# Patient Record
Sex: FEMALE | Race: WHITE | NOT HISPANIC OR LATINO | Employment: OTHER | URBAN - METROPOLITAN AREA
[De-identification: names, ages, dates, MRNs, and addresses within clinical notes are randomized per-mention and may not be internally consistent; named-entity substitution may affect disease eponyms.]

---

## 2018-11-07 RX ORDER — OXYCODONE AND ACETAMINOPHEN 5; 325 MG/1; MG/1
TABLET ORAL
Refills: 0 | COMMUNITY
Start: 2018-10-26 | End: 2018-11-26

## 2018-11-08 ENCOUNTER — OFFICE VISIT (OUTPATIENT)
Dept: FAMILY MEDICINE | Facility: CLINIC | Age: 63
End: 2018-11-08
Payer: MEDICARE

## 2018-11-08 VITALS
OXYGEN SATURATION: 97 % | TEMPERATURE: 98 F | RESPIRATION RATE: 18 BRPM | BODY MASS INDEX: 38.41 KG/M2 | WEIGHT: 225 LBS | SYSTOLIC BLOOD PRESSURE: 140 MMHG | HEART RATE: 115 BPM | DIASTOLIC BLOOD PRESSURE: 90 MMHG | HEIGHT: 64 IN

## 2018-11-08 DIAGNOSIS — E11.65 TYPE 2 DIABETES MELLITUS WITH HYPERGLYCEMIA, WITHOUT LONG-TERM CURRENT USE OF INSULIN: Primary | ICD-10-CM

## 2018-11-08 DIAGNOSIS — E66.01 MORBID OBESITY: ICD-10-CM

## 2018-11-08 DIAGNOSIS — I10 ESSENTIAL HYPERTENSION: ICD-10-CM

## 2018-11-08 DIAGNOSIS — Z23 NEED FOR INFLUENZA VACCINATION: ICD-10-CM

## 2018-11-08 DIAGNOSIS — K21.9 GASTROESOPHAGEAL REFLUX DISEASE WITHOUT ESOPHAGITIS: ICD-10-CM

## 2018-11-08 DIAGNOSIS — I70.0 ATHEROSCLEROSIS OF AORTA: ICD-10-CM

## 2018-11-08 PROCEDURE — 99204 OFFICE O/P NEW MOD 45 MIN: CPT | Mod: ,,, | Performed by: FAMILY MEDICINE

## 2018-11-08 PROCEDURE — 1101F PT FALLS ASSESS-DOCD LE1/YR: CPT | Mod: ,,, | Performed by: FAMILY MEDICINE

## 2018-11-08 PROCEDURE — 3080F DIAST BP >= 90 MM HG: CPT | Mod: ,,, | Performed by: FAMILY MEDICINE

## 2018-11-08 PROCEDURE — 3077F SYST BP >= 140 MM HG: CPT | Mod: ,,, | Performed by: FAMILY MEDICINE

## 2018-11-08 PROCEDURE — 3008F BODY MASS INDEX DOCD: CPT | Mod: ,,, | Performed by: FAMILY MEDICINE

## 2018-11-08 RX ORDER — OMEPRAZOLE 20 MG/1
20 CAPSULE, DELAYED RELEASE ORAL DAILY
Qty: 30 CAPSULE | Refills: 3 | Status: SHIPPED | OUTPATIENT
Start: 2018-11-08

## 2018-11-08 RX ORDER — METFORMIN HYDROCHLORIDE 1000 MG/1
1000 TABLET ORAL 2 TIMES DAILY WITH MEALS
Qty: 180 TABLET | Refills: 3 | Status: SHIPPED | OUTPATIENT
Start: 2018-11-08 | End: 2019-11-08

## 2018-11-08 RX ORDER — AMLODIPINE BESYLATE 5 MG/1
5 TABLET ORAL DAILY
Qty: 30 TABLET | Refills: 11 | Status: SHIPPED | OUTPATIENT
Start: 2018-11-08 | End: 2018-11-26

## 2018-11-08 RX ORDER — DOXYCYCLINE HYCLATE 100 MG
100 TABLET ORAL EVERY 12 HOURS
Qty: 60 TABLET | Refills: 3 | Status: SHIPPED | OUTPATIENT
Start: 2018-11-08 | End: 2018-11-08 | Stop reason: SDUPTHER

## 2018-11-08 RX ORDER — DOXYCYCLINE HYCLATE 100 MG
100 TABLET ORAL EVERY 12 HOURS
Qty: 180 TABLET | Refills: 2 | Status: SHIPPED | OUTPATIENT
Start: 2018-11-08 | End: 2019-02-12

## 2018-11-08 RX ORDER — OLMESARTAN MEDOXOMIL AND HYDROCHLOROTHIAZIDE 40/25 40; 25 MG/1; MG/1
1 TABLET ORAL DAILY
Qty: 30 TABLET | Refills: 11 | Status: SHIPPED | OUTPATIENT
Start: 2018-11-08

## 2018-11-08 NOTE — TELEPHONE ENCOUNTER
Patient called to state that her insurance is requesting a 90 day supply of this medication be sent instead of the 30 day. Please add the DX code and please approve to be sent to the patient's local pharmacy.    Thank you!

## 2018-11-08 NOTE — PROGRESS NOTES
SUBJECTIVE:    Patient ID: Yoshi Lennon is a 63 y.o. female.    Chief Complaint: Hospital Follow Up; Nephrolithiasis; and Diabetes    62 yo female new to this provider limited medical records to review pt was seen in the ER for possible nephrolithiasis (they assume she has passed the stone) Pt was noted to have uncontrolled HTN and a Blood glucose of 375. Pt has known diagnoses of HTN/DM has been appropriately trteated in the past but due to family and financial issues she has not taken any medications in years and has not had routine lab testing completed in years.    CT of abd 2017:  There is mild atherosclerosis of the abdominal aorta without aneurysmal dilatation.    Hypertension   This is a chronic problem. The current episode started more than 1 year ago. The problem has been gradually worsening since onset. The problem is uncontrolled. Pertinent negatives include no anxiety, blurred vision, chest pain, headaches, malaise/fatigue, neck pain, orthopnea, palpitations, peripheral edema, PND, shortness of breath or sweats. There are no associated agents to hypertension. Risk factors for coronary artery disease include diabetes mellitus, sedentary lifestyle and obesity. Past treatments include angiotensin blockers, calcium channel blockers and diuretics. Improvement on treatment: Pt stopped medications several years ago. Compliance problems include diet, exercise and medication cost.  Unknown. Unknown.   Diabetes   She presents for her follow-up diabetic visit. She has type 2 diabetes mellitus. Her disease course has been worsening. There are no hypoglycemic associated symptoms. Pertinent negatives for hypoglycemia include no headaches, seizures or sweats. Associated symptoms include polydipsia, polyphagia and polyuria. Pertinent negatives for diabetes include no blurred vision, no chest pain, no fatigue, no foot paresthesias, no foot ulcerations, no visual change, no weakness and no weight loss. There are no  hypoglycemic complications. Symptoms are worsening. (Unknown  ) Risk factors for coronary artery disease include diabetes mellitus, hypertension, obesity and sedentary lifestyle. When asked about current treatments, none were reported. She is compliant with treatment none of the time. Weight trend: Unknown. She is following a generally unhealthy diet. When asked about meal planning, she reported none. She has not had a previous visit with a dietitian. She never participates in exercise. Home blood sugar record trend: Unknown. An ACE inhibitor/angiotensin II receptor blocker is being taken. She does not see a podiatrist.Eye exam is not current.   Gastroesophageal Reflux   She complains of heartburn. She reports no chest pain, no sore throat or no wheezing. This is a chronic problem. The current episode started more than 1 year ago. The problem occurs frequently. The problem has been unchanged. The heartburn is located in the substernum. The heartburn wakes her from sleep. The heartburn does not limit her activity. The heartburn doesn't change with position. The symptoms are aggravated by lying down. Pertinent negatives include no anemia, fatigue, melena, muscle weakness, orthopnea or weight loss. Risk factors include obesity and lack of exercise. She has tried an antacid and a PPI for the symptoms. The treatment provided significant relief.         Past Medical History:   Diagnosis Date    Diabetes mellitus     GERD (gastroesophageal reflux disease)     HLD (hyperlipidemia)     Hypertension      Social History     Socioeconomic History    Marital status:      Spouse name: Not on file    Number of children: Not on file    Years of education: Not on file    Highest education level: Not on file   Social Needs    Financial resource strain: Not on file    Food insecurity - worry: Not on file    Food insecurity - inability: Not on file    Transportation needs - medical: Not on file    Transportation  needs - non-medical: Not on file   Occupational History    Not on file   Tobacco Use    Smoking status: Never Smoker    Smokeless tobacco: Never Used   Substance and Sexual Activity    Alcohol use: Yes     Comment: socially     Drug use: No    Sexual activity: Yes     Partners: Male   Other Topics Concern    Not on file   Social History Narrative    Not on file     Past Surgical History:   Procedure Laterality Date    COLON SURGERY      tumor removed from colon    INCISION AND DRAINAGE (I&D), ABSCESS Left 4/1/2016    Performed by [facility] at [facility]    INCISION AND DRAINAGE (I&D), BREAST Right 4/1/2016    Performed by [facility] at [facility]    ROTATOR CUFF REPAIR  2005    Right     History reviewed. No pertinent family history.  Current Outpatient Medications   Medication Sig Dispense Refill    naproxen sodium (ANAPROX) 220 MG tablet Take 440 mg by mouth every 12 (twelve) hours.      olmesartan-hydrochlorothiazide (BENICAR HCT) 40-25 mg per tablet Take 1 tablet by mouth once daily. 30 tablet 11    omeprazole (PRILOSEC) 20 MG capsule Take 1 capsule (20 mg total) by mouth once daily. 30 capsule 3    oxyCODONE-acetaminophen (PERCOCET) 5-325 mg per tablet TK 1 T PO Q 6 HOURS PRN P  0    amLODIPine (NORVASC) 5 MG tablet Take 1 tablet (5 mg total) by mouth once daily. 30 tablet 11    aspirin 81 MG Chew Take 1 tablet (81 mg total) by mouth once daily. 30 tablet 12    blood sugar diagnostic Strp 1 strip by Misc.(Non-Drug; Combo Route) route once daily. 100 strip 0    doxycycline (VIBRA-TABS) 100 MG tablet Take 1 tablet (100 mg total) by mouth every 12 (twelve) hours. 180 tablet 2    metFORMIN (GLUCOPHAGE) 1000 MG tablet Take 1 tablet (1,000 mg total) by mouth 2 (two) times daily with meals. 180 tablet 3     No current facility-administered medications for this visit.      Review of patient's allergies indicates:  No Known Allergies    Review of Systems  "  Constitutional: Negative for activity change, appetite change, fatigue, fever, malaise/fatigue, unexpected weight change and weight loss.   HENT: Negative for ear discharge, ear pain, sinus pressure, sinus pain and sore throat.    Eyes: Negative for blurred vision.   Respiratory: Negative for chest tightness, shortness of breath and wheezing.    Cardiovascular: Negative for chest pain, palpitations, orthopnea and PND.   Gastrointestinal: Positive for heartburn. Negative for melena.   Endocrine: Positive for polydipsia, polyphagia and polyuria.   Genitourinary: Negative for dysuria, frequency, hematuria and urgency.   Musculoskeletal: Negative for arthralgias, back pain, joint swelling, muscle weakness and neck pain.   Neurological: Negative for seizures, weakness, numbness and headaches.          Blood pressure (!) 140/90, pulse (!) 115, temperature 98.3 °F (36.8 °C), temperature source Oral, resp. rate 18, height 5' 4" (1.626 m), weight 102.1 kg (225 lb), SpO2 97 %. Body mass index is 38.62 kg/m².   Objective:      Physical Exam   Constitutional: She is oriented to person, place, and time. She appears well-developed and well-nourished. No distress.   HENT:   Head: Normocephalic and atraumatic.   Right Ear: External ear normal.   Left Ear: External ear normal.   Eyes: Conjunctivae and EOM are normal. Pupils are equal, round, and reactive to light. No scleral icterus.   Neck: Normal range of motion.   Cardiovascular: Regular rhythm and normal heart sounds.   No murmur heard.  Pulmonary/Chest: Effort normal and breath sounds normal. No respiratory distress. She has no wheezes.   Neurological: She is alert and oriented to person, place, and time.   Skin: Skin is warm and dry. Capillary refill takes less than 2 seconds. No rash noted. She is not diaphoretic.           Assessment:       1. Type 2 diabetes mellitus with hyperglycemia, without long-term current use of insulin    2. Essential hypertension    3. Morbid " obesity    4. Need for influenza vaccination    5. Gastroesophageal reflux disease without esophagitis         Plan:             Type 2 diabetes mellitus with hyperglycemia, without long-term current use of insulin  -     metFORMIN (GLUCOPHAGE) 1000 MG tablet; Take 1 tablet (1,000 mg total) by mouth 2 (two) times daily with meals.  Dispense: 180 tablet; Refill: 3  -     blood sugar diagnostic Strp; 1 strip by Misc.(Non-Drug; Combo Route) route once daily.  Dispense: 100 strip; Refill: 0  -     Lipid panel; Future; Expected date: 11/08/2018  -     Hemoglobin A1c; Future; Expected date: 11/08/2018  -     Microalbumin/creatinine urine ratio  Patient with long-standing diabetes has not been treated many years, previously on metformin and insulin patient has run out of all of her blood glucose testing supplies, has not taken her medication, will restart patient on metformin we will wrap the dose starting at 500 mg daily and over the next 2 weeks we'll wrap up to thousand milligrams twice a day. Patient given a blood glucose log and a blood glucose monitor she's instructed to check her morning sugars daily while fasting. Will get A1c and microalbumin lipid panel before next visit, will follow patient up monthly until we get her blood sugar under control.  We discussed diet and exercise, patient instructed to decrease her carbohydrate intake and increase her daily exercise starting with 15 minutes of walking daily.    Essential hypertension  -     amLODIPine (NORVASC) 5 MG tablet; Take 1 tablet (5 mg total) by mouth once daily.  Dispense: 30 tablet; Refill: 11  -     olmesartan-hydrochlorothiazide (BENICAR HCT) 40-25 mg per tablet; Take 1 tablet by mouth once daily.  Dispense: 30 tablet; Refill: 11  Patient with elevated blood pressure today not on medications have asked that she start Benicar first take this for several days then as the Norvasc these are the medications the patient was on years ago for discontinuing all  medications.  Morbid obesity  The patient is asked to make an attempt to improve diet and exercise patterns to aid in medical management of this problem.    Need for influenza vaccination  -     Influenza - High Dose (65+) (PF) (IM)    Gastroesophageal reflux disease without esophagitis  -     omeprazole (PRILOSEC) 20 MG capsule; Take 1 capsule (20 mg total) by mouth once daily.  Dispense: 30 capsule; Refill: 3  Not currently an issue will refill her medications.    Other orders  -     doxycycline (VIBRA-TABS) 100 MG tablet; Take 1 tablet (100 mg total) by mouth every 12 (twelve) hours.  Dispense: 60 tablet; Refill: 3      Atherosclerosis of the Aorta  -Previously noted on CTThere is mild atherosclerosis of the abdominal aorta without aneurysmal dilatation. Will continue to follow. Not currently an issue, will get DM and HTN under control first.

## 2018-11-09 RX ORDER — DEXTROSE 4 G
1 TABLET,CHEWABLE ORAL DAILY
Qty: 1 EACH | Refills: 0 | Status: SHIPPED | OUTPATIENT
Start: 2018-11-09 | End: 2019-11-09

## 2018-11-09 RX ORDER — LANCETS 33 GAUGE
1 EACH MISCELLANEOUS 4 TIMES DAILY
Qty: 100 EACH | Refills: 2 | Status: SHIPPED | OUTPATIENT
Start: 2018-11-09

## 2018-11-09 NOTE — TELEPHONE ENCOUNTER
The pharmacy faxed over a request for these two items. It is preferred by the patient's insurance. Please approve so it can go to the pharmacy. Thank you!

## 2018-11-26 ENCOUNTER — OFFICE VISIT (OUTPATIENT)
Dept: UROLOGY | Facility: CLINIC | Age: 63
End: 2018-11-26
Payer: MEDICARE

## 2018-11-26 ENCOUNTER — APPOINTMENT (OUTPATIENT)
Dept: LAB | Facility: HOSPITAL | Age: 63
End: 2018-11-26
Attending: UROLOGY
Payer: MEDICARE

## 2018-11-26 VITALS
HEIGHT: 64 IN | DIASTOLIC BLOOD PRESSURE: 98 MMHG | SYSTOLIC BLOOD PRESSURE: 162 MMHG | BODY MASS INDEX: 37.23 KG/M2 | HEART RATE: 107 BPM | WEIGHT: 218.06 LBS

## 2018-11-26 DIAGNOSIS — R10.9 RIGHT FLANK PAIN: ICD-10-CM

## 2018-11-26 DIAGNOSIS — R31.0 GROSS HEMATURIA: Primary | ICD-10-CM

## 2018-11-26 DIAGNOSIS — N13.30 HYDRONEPHROSIS, UNSPECIFIED HYDRONEPHROSIS TYPE: ICD-10-CM

## 2018-11-26 LAB
BACTERIA #/AREA URNS HPF: ABNORMAL /HPF
BILIRUB SERPL-MCNC: ABNORMAL MG/DL
BILIRUB UR QL STRIP: NEGATIVE
BLOOD URINE, POC: 50
CLARITY UR: CLEAR
COLOR UR: YELLOW
COLOR, POC UA: YELLOW
GLUCOSE UR QL STRIP: 1000
GLUCOSE UR QL STRIP: ABNORMAL
HGB UR QL STRIP: NEGATIVE
KETONES UR QL STRIP: ABNORMAL
KETONES UR QL STRIP: NEGATIVE
LEUKOCYTE ESTERASE UR QL STRIP: NEGATIVE
LEUKOCYTE ESTERASE URINE, POC: ABNORMAL
MICROSCOPIC COMMENT: ABNORMAL
NITRITE UR QL STRIP: POSITIVE
NITRITE, POC UA: ABNORMAL
PH UR STRIP: 6 [PH] (ref 5–8)
PH, POC UA: ABNORMAL
PROT UR QL STRIP: NEGATIVE
PROTEIN, POC: ABNORMAL
SP GR UR STRIP: 1.01 (ref 1–1.03)
SPECIFIC GRAVITY, POC UA: ABNORMAL
URN SPEC COLLECT METH UR: ABNORMAL
UROBILINOGEN UR STRIP-ACNC: NEGATIVE EU/DL
UROBILINOGEN, POC UA: ABNORMAL
WBC #/AREA URNS HPF: 5 /HPF (ref 0–5)
YEAST URNS QL MICRO: ABNORMAL

## 2018-11-26 PROCEDURE — 3077F SYST BP >= 140 MM HG: CPT | Mod: CPTII,S$GLB,, | Performed by: UROLOGY

## 2018-11-26 PROCEDURE — 87086 URINE CULTURE/COLONY COUNT: CPT

## 2018-11-26 PROCEDURE — 88112 CYTOPATH CELL ENHANCE TECH: CPT | Performed by: PATHOLOGY

## 2018-11-26 PROCEDURE — 3080F DIAST BP >= 90 MM HG: CPT | Mod: CPTII,S$GLB,, | Performed by: UROLOGY

## 2018-11-26 PROCEDURE — 87077 CULTURE AEROBIC IDENTIFY: CPT

## 2018-11-26 PROCEDURE — 1101F PT FALLS ASSESS-DOCD LE1/YR: CPT | Mod: CPTII,S$GLB,, | Performed by: UROLOGY

## 2018-11-26 PROCEDURE — 87186 SC STD MICRODIL/AGAR DIL: CPT

## 2018-11-26 PROCEDURE — 99999 PR PBB SHADOW E&M-EST. PATIENT-LVL IV: CPT | Mod: PBBFAC,,, | Performed by: UROLOGY

## 2018-11-26 PROCEDURE — 81000 URINALYSIS NONAUTO W/SCOPE: CPT

## 2018-11-26 PROCEDURE — 81002 URINALYSIS NONAUTO W/O SCOPE: CPT | Mod: S$GLB,,, | Performed by: UROLOGY

## 2018-11-26 PROCEDURE — 87088 URINE BACTERIA CULTURE: CPT

## 2018-11-26 PROCEDURE — 51701 INSERT BLADDER CATHETER: CPT | Mod: S$GLB,,, | Performed by: UROLOGY

## 2018-11-26 PROCEDURE — 99205 OFFICE O/P NEW HI 60 MIN: CPT | Mod: 25,S$GLB,, | Performed by: UROLOGY

## 2018-11-26 PROCEDURE — 3008F BODY MASS INDEX DOCD: CPT | Mod: CPTII,S$GLB,, | Performed by: UROLOGY

## 2018-11-26 NOTE — H&P (VIEW-ONLY)
[facility] Note - [place]  Staff: MD Jb    Referring provider and please cc: Tanner Sanchez  PCP: [facility]     Chief Complaint: right flank pain, gross hematuria    Subjective:        HPI: Yoshi Lennon is a 63 y.o. female presents with     Gross hematuria and right flank pain  -on 10/26/18 she went to [facility] with right sided anterior abdominal pain and gross hematuria (pink urine). Her ua showed large blood, no leukocytes and she had no uti sx. Her wbc was 8, her cr was 0.7. A ctrss showed mild hydroureteronephrosis down to bladder with no stone and some sonam-nephric stranding. No stones seen on the left.  She was sent home with pain medicine and abx and pain and hematuria resolved within 2 days. However a few days ago she started having pain again but this time in the right flank. No uti's sx, no fevers, no gross hematuria. Today is pain 4/10. ua shows 50 blood/1000 glucose.  -she does state that she had right flank pain in may 2017 similar to this. A ctrss done at that time showed no hydro and no stones.   -no h/o recurrent uti's. On no anti-coagulation (no asa). No h/o kidney stones. No h/o smoking or exposed to 2nd hand smoking and prior to this had never had GH and prior to last year had never had pain on the right side. No vaginal surgery or hysterectomy. s    Ua void: tr leuk/50 blood  ua cath: sent for urinalysis, culture, cytology   Urine history:  10/26/18 No cx, void: large blood, 186 rbc- c/o right flank pain and GH.   5/3/17  No cx, void: 2+ketones/3+glucose, occ yeast    ECOG Status: 0    G1, P 1, vaginal     REVIEW OF SYSTEMS:  General ROS: no fevers, no chills  Psychological ROS: no depression  Endocrine ROS: no heat or cold  Respiratory ROS: no SOB  Cardiovascular ROS: no CP  Gastrointestinal ROS: no abdominal pain, no constipation, no diarrhea, noBRBPR  Musculoskeletal ROS: no muscle pain  Neurological ROS: no headaches  Dermatological ROS: no  rashes  HEENT: noglasses, no sinus   ROS: per HPI     PMHx:  Past Medical History:   Diagnosis Date    Diabetes mellitus     GERD (gastroesophageal reflux disease)     HLD (hyperlipidemia)     Hypertension      Kidney stones: No    PSHx:  Past Surgical History:   Procedure Laterality Date    COLON SURGERY      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, in her 20s    INCISION AND DRAINAGE (I&D), ABSCESS Left 4/1/2016    [facility]formed [facility]    INCISION AND DRAINAGE (I&D), BREAST Right 4/1/2016    Performed by [facility] at [facility] OR 2ND [facility]LR    ROTATOR CUFF REPAIR  2005    Right     Urologic or Gynecologic Surgery: no     Stents/Valves/Foreign Bodies: none  Cardiologist: none  Gynecologist: none- denies any vaginal bleeding  Colonoscopy: <10 years ago and was normal    Fam Hx:   malignancies: No , gyn malignancies: mgm with ovarian cancer . Mother alive 89 lives in [place] by herself.   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     Soc Hx:   No tobacco.  No alcohol  Lives in [place] with daughter  :yes  Children: 1  Occupation: retired     Allergies:  Patient has no known allergies.    Medications: reviewed   Anticoagulation: No    Objective:     Vitals:    11/26/18 1028   BP: (!) 162/98   Pulse: 107       General:WDWN in NAD  Eyes: PERRLA, normal conjunctiva  Respiratory: no increased work on breathing. No wheezing.   Cardiovascular: No obvious extremity edema. Warm and well perfused.   GI: no palpation of masses. No tenderness. No hepatosplenomegaly to palpation.  Musculoskeletal: normal range of motion of bilateral upper extremities. Normal muscle strength and tone.  Skin: no obvious rashes or lesions. No tightening of skin noted.  Neurologic: CN grossly normal. Normal sensation.   Psychiatric: awake, alert and  oriented x 3. Mood and affect normal. Cooperative.    Pelvic exam  External Genitalia: normal hair distribution, no lesions  Urethral meatus: normal without prolapse or caruncle  Urethra: without tenderness or mass  Bladder: without fulness or tenderness  Vagina: normal appearing. No discharge or lesions. no prolapse.   Anus and perineum: appear normal  In and out cath performed with 100cc residual  Levator ani tenderness: none    LABS REVIEW:      Lab Results   Component Value Date    WBC 9.37 05/03/2017    HGB 15.4 05/03/2017    HCT 44.7 05/03/2017    MCV 85 05/03/2017     05/03/2017     BMP  Lab Results   Component Value Date     05/03/2017    K 3.8 05/03/2017     05/03/2017    CO2 25 05/03/2017    BUN 12 05/03/2017    CREATININE 0.49 (L) 05/03/2017    CALCIUM 9.2 05/03/2017    ANIONGAP 10 05/03/2017    ESTGFRAFRICA >60 05/03/2017    EGFRNONAA >60 05/03/2017         PATHOLOGY REVIEW:  none    RADIOGRAPHIC REVIEW:  ctap w 5/3/17  1.  No acute findings in the abdomen or pelvis  2.  Sigmoid diverticulosis without pericolonic inflammation  3.  Hepatic steatosis  4.  Bilateral adrenal nodules are indeterminate.  Statistically, lipid poor adenomas are most common, however metastasis could have a similar appearance.  MRI or CT adrenal mass protocol of the upper abdomen could be performed for further evaluation.  5.  Right ovarian cyst      Assessment:       1. Gross hematuria    2. Right flank pain          Plan:     Gross hematuria, right hydro and right flank pain  -could be stone, tumor or uti  -unlikely stone bc ct from a year ago showed no stone but could be gayatri tone that passed  -unlikely tumor bc no h/o smoking but ctu and cystoscopy will tell us more  -unlikley uti bc no leukocytes in urine previously and no uti sx    Will proceed with workup  ctu- ensure distal ureters evaluated. If hydro still present then will need mag3 renal scan to eval right upj (unlikely since dilated to distal  ureter)  Urine cytology  Urine culture (cath)- today  Urinalysis (cath) today   Cystoscopy on dec 10th.    F/u for cytoscopy on dec 10th with urine sample 1 week prior    Stop metformin 48 hours after ct scan. Labs before ct scan, can also do 's at this time.     I spent 60 minutes with the patient of which more than half was spent in direct consultation with the patient in regards to our treatment and plan.      Astrid Muhammad MD

## 2018-11-26 NOTE — PATIENT INSTRUCTIONS
Gross hematuria, right hydro and right flank pain  -could be stone, tumor or uti  -unlikely stone bc ct from a year ago showed no stone but could be gayatri tone that passed  -unlikely tumor bc no h/o smoking but ctu and cystoscopy will tell us more  -unlikley uti bc no leukocytes in urine previously and no uti sx    Will proceed with workup  ctu- ensure distal ureters evaluated. If hydro still present then will need mag3 renal scan to eval right upj (unlikely since dilated to distal ureter)  Urine cytology  Urine culture (cath)- today  Urinalysis (cath) today   Cystoscopy on dec 10th.    F/u for cytoscopy on dec 10th with urine sample 1 week prior    Stop metformin 48 hours after ct scan. Labs before ct scan, can also do 's at this time.

## 2018-11-26 NOTE — PROGRESS NOTES
[facility] Note - [place]  Staff: MD Jb    Referring provider and please cc: Tanner Sanchez  PCP: [facility]     Chief Complaint: right flank pain, gross hematuria    Subjective:        HPI: Yoshi Lennon is a 63 y.o. female presents with     Gross hematuria and right flank pain  -on 10/26/18 she went to [facility] with right sided anterior abdominal pain and gross hematuria (pink urine). Her ua showed large blood, no leukocytes and she had no uti sx. Her wbc was 8, her cr was 0.7. A ctrss showed mild hydroureteronephrosis down to bladder with no stone and some sonam-nephric stranding. No stones seen on the left.  She was sent home with pain medicine and abx and pain and hematuria resolved within 2 days. However a few days ago she started having pain again but this time in the right flank. No uti's sx, no fevers, no gross hematuria. Today is pain 4/10. ua shows 50 blood/1000 glucose.  -she does state that she had right flank pain in may 2017 similar to this. A ctrss done at that time showed no hydro and no stones.   -no h/o recurrent uti's. On no anti-coagulation (no asa). No h/o kidney stones. No h/o smoking or exposed to 2nd hand smoking and prior to this had never had GH and prior to last year had never had pain on the right side. No vaginal surgery or hysterectomy. s    Ua void: tr leuk/50 blood  ua cath: sent for urinalysis, culture, cytology   Urine history:  10/26/18 No cx, void: large blood, 186 rbc- c/o right flank pain and GH.   5/3/17  No cx, void: 2+ketones/3+glucose, occ yeast    ECOG Status: 0    G1, P 1, vaginal     REVIEW OF SYSTEMS:  General ROS: no fevers, no chills  Psychological ROS: no depression  Endocrine ROS: no heat or cold  Respiratory ROS: no SOB  Cardiovascular ROS: no CP  Gastrointestinal ROS: no abdominal pain, no constipation, no diarrhea, noBRBPR  Musculoskeletal ROS: no muscle pain  Neurological ROS: no headaches  Dermatological ROS: no  rashes  HEENT: noglasses, no sinus   ROS: per HPI     PMHx:  Past Medical History:   Diagnosis Date    Diabetes mellitus     GERD (gastroesophageal reflux disease)     HLD (hyperlipidemia)     Hypertension      Kidney stones: No    PSHx:  Past Surgical History:   Procedure Laterality Date    COLON SURGERY      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, in her 20s    INCISION AND DRAINAGE (I&D), ABSCESS Left 4/1/2016    Performed by Joshua Goldberg, MD at [facility]    INCISION AND DRAINAGE (I&D), BREAST Right 4/1/2016    Performed by Joshua Goldberg, MD at [facility] OR 2ND FLR    ROTATOR CUFF REPAIR  2005    Right     Urologic or Gynecologic Surgery: no     Stents/Valves/Foreign Bodies: none  Cardiologist: none  Gynecologist: none- denies any vaginal bleeding  Colonoscopy: <10 years ago and was normal    Fam Hx:   malignancies: No , gyn malignancies: mgm with ovarian cancer . Mother alive 89 lives in [place] by herself.   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     Soc Hx:   No tobacco.  No alcohol  Lives in [place] with daughter  :yes  Children: 1  Occupation: retired     Allergies:  Patient has no known allergies.    Medications: reviewed   Anticoagulation: No    Objective:     Vitals:    11/26/18 1028   BP: (!) 162/98   Pulse: 107       General:WDWN in NAD  Eyes: PERRLA, normal conjunctiva  Respiratory: no increased work on breathing. No wheezing.   Cardiovascular: No obvious extremity edema. Warm and well perfused.   GI: no palpation of masses. No tenderness. No hepatosplenomegaly to palpation.  Musculoskeletal: normal range of motion of bilateral upper extremities. Normal muscle strength and tone.  Skin: no obvious rashes or lesions. No tightening of skin noted.  Neurologic: CN grossly normal. Normal sensation.   Psychiatric: awake, alert and  oriented x 3. Mood and affect normal. Cooperative.    Pelvic exam  External Genitalia: normal hair distribution, no lesions  Urethral meatus: normal without prolapse or caruncle  Urethra: without tenderness or mass  Bladder: without fulness or tenderness  Vagina: normal appearing. No discharge or lesions. no prolapse.   Anus and perineum: appear normal  In and out cath performed with 100cc residual  Levator ani tenderness: none    LABS REVIEW:      Lab Results   Component Value Date    WBC 9.37 05/03/2017    HGB 15.4 05/03/2017    HCT 44.7 05/03/2017    MCV 85 05/03/2017     05/03/2017     BMP  Lab Results   Component Value Date     05/03/2017    K 3.8 05/03/2017     05/03/2017    CO2 25 05/03/2017    BUN 12 05/03/2017    CREATININE 0.49 (L) 05/03/2017    CALCIUM 9.2 05/03/2017    ANIONGAP 10 05/03/2017    ESTGFRAFRICA >60 05/03/2017    EGFRNONAA >60 05/03/2017         PATHOLOGY REVIEW:  none    RADIOGRAPHIC REVIEW:  ctap w 5/3/17  1.  No acute findings in the abdomen or pelvis  2.  Sigmoid diverticulosis without pericolonic inflammation  3.  Hepatic steatosis  4.  Bilateral adrenal nodules are indeterminate.  Statistically, lipid poor adenomas are most common, however metastasis could have a similar appearance.  MRI or CT adrenal mass protocol of the upper abdomen could be performed for further evaluation.  5.  Right ovarian cyst      Assessment:       1. Gross hematuria    2. Right flank pain          Plan:     Gross hematuria, right hydro and right flank pain  -could be stone, tumor or uti  -unlikely stone bc ct from a year ago showed no stone but could be gayatri tone that passed  -unlikely tumor bc no h/o smoking but ctu and cystoscopy will tell us more  -unlikley uti bc no leukocytes in urine previously and no uti sx    Will proceed with workup  ctu- ensure distal ureters evaluated. If hydro still present then will need mag3 renal scan to eval right upj (unlikely since dilated to distal  ureter)  Urine cytology  Urine culture (cath)- today  Urinalysis (cath) today   Cystoscopy on dec 10th.    F/u for cytoscopy on dec 10th with urine sample 1 week prior    Stop metformin 48 hours after ct scan. Labs before ct scan, can also do 's at this time.     I spent 60 minutes with the patient of which more than half was spent in direct consultation with the patient in regards to our treatment and plan.      Astrid Muhammad MD

## 2018-11-28 ENCOUNTER — TELEPHONE (OUTPATIENT)
Dept: UROLOGY | Facility: CLINIC | Age: 63
End: 2018-11-28

## 2018-11-28 NOTE — TELEPHONE ENCOUNTER
----- Message from Gracy Benítez sent at 11/28/2018  8:19 AM CST -----  Contact: Self  Patient needs to speak to the nurse about her CT Scan  test she has scheduled for tomorrow 11/29    Please call back [phone]

## 2018-11-29 ENCOUNTER — HOSPITAL ENCOUNTER (OUTPATIENT)
Dept: RADIOLOGY | Facility: HOSPITAL | Age: 63
Discharge: HOME | End: 2018-11-29
Attending: UROLOGY
Payer: MEDICARE

## 2018-11-29 DIAGNOSIS — R10.9 RIGHT FLANK PAIN: ICD-10-CM

## 2018-11-29 DIAGNOSIS — R31.0 GROSS HEMATURIA: ICD-10-CM

## 2018-11-29 LAB — BACTERIA UR CULT: NORMAL

## 2018-11-29 PROCEDURE — 74178 CT ABD&PLV WO CNTR FLWD CNTR: CPT | Mod: 26,,, | Performed by: RADIOLOGY

## 2018-11-29 PROCEDURE — 25500020 PHARM REV CODE 255

## 2018-11-29 PROCEDURE — 74178 CT ABD&PLV WO CNTR FLWD CNTR: CPT | Mod: TC

## 2018-11-29 RX ORDER — SODIUM CHLORIDE 9 MG/ML
INJECTION, SOLUTION INTRAVENOUS
Status: DISPENSED
Start: 2018-11-29 | End: 2018-11-29

## 2018-11-29 RX ORDER — CIPROFLOXACIN 500 MG/1
500 TABLET ORAL 2 TIMES DAILY
Qty: 10 TABLET | Refills: 0 | Status: SHIPPED | OUTPATIENT
Start: 2018-11-29 | End: 2018-12-04

## 2018-11-29 RX ADMIN — IOHEXOL 100 ML: 350 INJECTION, SOLUTION INTRAVENOUS at 08:11

## 2018-12-10 ENCOUNTER — HOSPITAL ENCOUNTER (OUTPATIENT)
Facility: AMBULARY SURGERY CENTER | Age: 63
Discharge: HOME | End: 2018-12-10
Attending: UROLOGY | Admitting: UROLOGY
Payer: MEDICARE

## 2018-12-10 DIAGNOSIS — R31.0 GROSS HEMATURIA: ICD-10-CM

## 2018-12-10 DIAGNOSIS — R10.9 RIGHT FLANK PAIN: ICD-10-CM

## 2018-12-10 LAB
BACTERIA SPEC CULT: NORMAL
BILIRUB SERPL-MCNC: NORMAL MG/DL
BLOOD URINE, POC: NORMAL
CASTS: NORMAL
COLOR, POC UA: NORMAL
CRYSTALS: NORMAL
GLUCOSE UR QL STRIP: 250
KETONES UR QL STRIP: NORMAL
LEUKOCYTE ESTERASE URINE, POC: NORMAL
NITRITE, POC UA: NORMAL
PH, POC UA: 5
PROTEIN, POC: NORMAL
RBC CELLS COUNTED: NORMAL
SPECIFIC GRAVITY, POC UA: 1.02
UROBILINOGEN, POC UA: NORMAL
WHITE BLOOD CELLS: NORMAL

## 2018-12-10 PROCEDURE — 52000 CYSTOURETHROSCOPY: CPT | Mod: ,,, | Performed by: UROLOGY

## 2018-12-10 PROCEDURE — 52000 CYSTOURETHROSCOPY: CPT | Performed by: UROLOGY

## 2018-12-10 RX ORDER — LIDOCAINE HYDROCHLORIDE 20 MG/ML
JELLY TOPICAL
Status: DISPENSED
Start: 2018-12-10 | End: 2018-12-11

## 2018-12-10 RX ORDER — WATER 1000 ML/1000ML
INJECTION, SOLUTION INTRAVENOUS
Status: DISCONTINUED | OUTPATIENT
Start: 2018-12-10 | End: 2018-12-10 | Stop reason: HOSPADM

## 2018-12-10 RX ORDER — LIDOCAINE HYDROCHLORIDE 20 MG/ML
JELLY TOPICAL
Status: DISCONTINUED | OUTPATIENT
Start: 2018-12-10 | End: 2018-12-10 | Stop reason: HOSPADM

## 2018-12-10 RX ORDER — CIPROFLOXACIN 500 MG/1
500 TABLET ORAL 2 TIMES DAILY
COMMUNITY
End: 2019-02-12

## 2018-12-10 NOTE — OP NOTE
[facility] [place] Procedure Note  Date: 12/10/2018    Procedure:   1. Flexible cysto-uretheroscopy     Pre Procedure Diagnosis:gross hematuria    Post Procedure Diagnosis: same, see below for findings    Surgeon: Astrid Muhammad MD    Specimen: none    Anesthesia: 2% uro-jet lidocaine jelly for local analgesia    Indications: Yoshi Lennon is a 63 y.o. female  With above pre-procedure diagnosis. Urine reviewed. H&P reviewed.     Procedure in detail:   Flexible cysto-urethroscopy was performed after consent was obtained.  The risks and benefits were explained.    2% lidocaine urojet was used for local analgesia.  The genitalia was prepped and draped in the sterile fashion with betadine.    The flexible scope was advanced into the urethra and into the bladder.  Bilateral ureteral orifice were evaluated and noted to be normal with clear efflux.  The bladder was completely surveyed in a systematic fashion and the scope was retroflexed.    Cystoscopy findings as below in findings.   No strictures were noted.     The patient tolerated the procedure well without complication.    Findings: (pictures were  uploaded into media)  1. Cystoscopy findings:  Inflammatory bladder polyps throughout  2. Vaginal exam findings: not performed  3. Other findings: none  No Bladder biopsy:  No bladder tumors seen     Assesment: Yoshi Lennon is a 63 y.o. female with gross hematuria, likely from uti. Workup negative including ct scan, cytology and cystoscopy.   Plan:   F/u prn recurrent gross hematuria (pink or red urine).   F/u if having recurrent culture proven uti's (more than 4 in year). Gave basic recs today  Finish abx.       Astrid Muhammad MD

## 2018-12-10 NOTE — PLAN OF CARE
Pt  ready to go home , stable, tolerating fluids. Denies pain.. Ambulated to car . Home w/ family.

## 2018-12-10 NOTE — DISCHARGE SUMMARY
[facility]  Urology  Discharge Note - Short Stay      Patient Name: Yoshi Lennon  MRN: [id number]  Discharge Date and Time:  12/10/2018 2:02 PM  Attending Physician: Astrid Muhammad,*   Discharging Provider: Astrid Muhammad MD  Primary Care Physician: Tanner Sanchez MD    Final Active Diagnoses:    Diagnosis Date Noted POA    Gross hematuria [R31.0] 12/10/2018 Yes      Problems Resolved During this Admission:       Final Diagnoses: Same as principal problem.    Hospital Course: Patient was admitted for an outpatient procedure and tolerated the procedure well with no complications.*    Procedure(s) (LRB):  CYSTOSCOPY (N/A)     Indwelling Lines/Drains at time of discharge:   Lines/Drains/Airways          None          Discharged Condition: good    Disposition: home    Follow Up:      Patient Instructions:      Activity as tolerated       Medications:  Reconciled Home Medications:      Medication List      CONTINUE taking these medications    aspirin 81 MG Chew  Take 1 tablet (81 mg total) by mouth once daily.     blood sugar diagnostic Strp  1 strip by Misc.(Non-Drug; Combo Route) route once daily.     blood-glucose meter Misc  Commonly known as:  TRUE METRIX GLUCOSE METER  1 Units by Misc.(Non-Drug; Combo Route) route once daily. Please check blood sugar while fasting (fisrt thing in the morning)     ciprofloxacin HCl 500 MG tablet  Commonly known as:  CIPRO  Take 500 mg by mouth 2 (two) times daily.     doxycycline 100 MG tablet  Commonly known as:  VIBRA-TABS  Take 1 tablet (100 mg total) by mouth every 12 (twelve) hours.     lancets 33 gauge Misc  1 lancet by Misc.(Non-Drug; Combo Route) route 4 (four) times daily.     metFORMIN 1000 MG tablet  Commonly known as:  GLUCOPHAGE  Take 1 tablet (1,000 mg total) by mouth 2 (two) times daily with meals.     olmesartan-hydrochlorothiazide 40-25 mg per tablet  Commonly known as:  BENICAR HCT  Take 1 tablet by mouth once daily.      omeprazole 20 MG capsule  Commonly known as:  PRILOSEC  Take 1 capsule (20 mg total) by mouth once daily.            Discharge Procedure Orders (must include Diet, Follow-up, Activity):   Discharge Procedure Orders (must include Diet, Follow-up, Activity)   Activity as tolerated            Astrid Muhammad MD  Urology  [facility]

## 2018-12-10 NOTE — DISCHARGE INSTRUCTIONS
Cystoscopy    Cystoscopy is a procedure that lets your doctor look directly inside your urethra and bladder. It can be used to:  · Help diagnose a problem with your urethra, bladder, or kidneys.  · Take a sample (biopsy) of bladder or urethral tissue.  · Treat certain problems (such as removing kidney stones).  · Place a stent to bypass an obstruction.  · Take special X-rays of the kidneys.  Based on the findings, your doctor may recommend other tests or treatments.  What is a cystoscope?  A cystoscope is a telescope-like instrument that contains lenses and fiberoptics (small glass wires that make bright light). The cystoscope may be straight and rigid, or flexible to bend around curves in the urethra. The doctor may look directly into the cystoscope, or project the image onto a monitor.  Getting ready  · Ask your doctor if you should stop taking any medicines before the procedure.  · Ask whether you should avoid eating or drinking anything after midnight before the procedure.  · Follow any other instructions your doctor gives you.  Tell your doctor before the exam if you:  · Take any medicines, such as aspirin or blood thinners  · Have allergies to any medicines  · Are pregnant   The procedure  Cystoscopy is done in the doctors office, surgery center, or hospital. The doctor and a nurse are present during the procedure. It takes only a few minutes, longer if a biopsy, X-ray, or treatment needs to be done.  During the procedure:  · You lie on an exam table on your back, knees bent and legs apart. You are covered with a drape.  · Your urethra and the area around it are washed. Anesthetic jelly may be applied to numb the urethra. Other pain medicine is usually not needed. In some cases, you may be offered a mild sedative to help you relax. If a more extensive procedure is to be done, such as a biopsy or kidney stone removal, general anesthesia may be needed.  · The cystoscope is inserted. A sterile fluid is put  into the bladder to expand it. You may feel pressure from this fluid.  · When the procedure is done, the cystoscope is removed.  After the procedure  If you had a sedative, general anesthesia, or spinal anesthesia, you must have someone drive you home. Once youre home:  · Drink plenty of fluids.  · You may have burning or light bleeding when you urinate--this is normal.  · Medicines may be prescribed to ease any discomfort or prevent infection. Take these as directed.  · Call your doctor if you have heavy bleeding or blood clots, burning that lasts more than a day, a fever over 100°F  (38° C), or trouble urinating.  Date Last Reviewed: 1/1/2017  © 0942-3245 [employer]. [address] [place] [address], [place] [address]. All rights reserved. This information is not intended as a substitute for professional medical care. Always follow your healthcare professional's instructions.

## 2018-12-10 NOTE — INTERVAL H&P NOTE
H&P Update:     Indications for today's procedure: gross hematuria  Consented for: cystoscopy    I concur with the findings from the last H&P with studies done since last h&P or changes to the patient's history as below:   ctu showed no stones  Cytology negative  Urine culture + and treated 5d prior     Urinalysis today: megatove . The pt denies any uti symptoms.   Urine culture reviewed: .    Anti-coagulation: none      Lab Results   Component Value Date    WBC 9.37 05/03/2017    HGB 15.4 05/03/2017    HCT 44.7 05/03/2017    MCV 85 05/03/2017     05/03/2017     BMP  Lab Results   Component Value Date     05/03/2017    K 3.8 05/03/2017     05/03/2017    CO2 25 05/03/2017    BUN 12 05/03/2017    CREATININE 0.7 11/29/2018    CALCIUM 9.2 05/03/2017    ANIONGAP 10 05/03/2017    ESTGFRAFRICA >60 11/29/2018    EGFRNONAA >60 11/29/2018         This patient has been cleared for surgery in ambulatory surgical facility.

## 2018-12-11 VITALS
HEART RATE: 111 BPM | OXYGEN SATURATION: 97 % | DIASTOLIC BLOOD PRESSURE: 80 MMHG | SYSTOLIC BLOOD PRESSURE: 125 MMHG | RESPIRATION RATE: 20 BRPM | BODY MASS INDEX: 37.22 KG/M2 | TEMPERATURE: 98 F | WEIGHT: 218 LBS | HEIGHT: 64 IN

## 2019-02-12 ENCOUNTER — TELEPHONE (OUTPATIENT)
Dept: FAMILY MEDICINE | Facility: CLINIC | Age: 64
End: 2019-02-12

## 2019-02-12 ENCOUNTER — OFFICE VISIT (OUTPATIENT)
Dept: FAMILY MEDICINE | Facility: CLINIC | Age: 64
End: 2019-02-12
Payer: MEDICARE

## 2019-02-12 VITALS
BODY MASS INDEX: 37.49 KG/M2 | TEMPERATURE: 99 F | HEART RATE: 119 BPM | OXYGEN SATURATION: 98 % | HEIGHT: 64 IN | WEIGHT: 219.63 LBS | SYSTOLIC BLOOD PRESSURE: 128 MMHG | RESPIRATION RATE: 18 BRPM | DIASTOLIC BLOOD PRESSURE: 72 MMHG

## 2019-02-12 DIAGNOSIS — S76.301D RIGHT HAMSTRING INJURY, SUBSEQUENT ENCOUNTER: Primary | ICD-10-CM

## 2019-02-12 DIAGNOSIS — E11.9 TYPE 2 DIABETES MELLITUS WITHOUT COMPLICATION, WITHOUT LONG-TERM CURRENT USE OF INSULIN: ICD-10-CM

## 2019-02-12 DIAGNOSIS — L71.9 ROSACEA: ICD-10-CM

## 2019-02-12 DIAGNOSIS — Z12.39 BREAST CANCER SCREENING: ICD-10-CM

## 2019-02-12 DIAGNOSIS — I10 BENIGN ESSENTIAL HYPERTENSION: ICD-10-CM

## 2019-02-12 DIAGNOSIS — Z11.59 ENCOUNTER FOR HEPATITIS C SCREENING TEST FOR LOW RISK PATIENT: ICD-10-CM

## 2019-02-12 DIAGNOSIS — S76.301D RIGHT HAMSTRING INJURY, SUBSEQUENT ENCOUNTER: ICD-10-CM

## 2019-02-12 DIAGNOSIS — E78.49 OTHER HYPERLIPIDEMIA: ICD-10-CM

## 2019-02-12 DIAGNOSIS — Z78.0 MENOPAUSE: ICD-10-CM

## 2019-02-12 PROCEDURE — 99214 PR OFFICE/OUTPT VISIT, EST, LEVL IV, 30-39 MIN: ICD-10-PCS | Mod: ,,, | Performed by: FAMILY MEDICINE

## 2019-02-12 PROCEDURE — 99214 OFFICE O/P EST MOD 30 MIN: CPT | Mod: ,,, | Performed by: FAMILY MEDICINE

## 2019-02-12 PROCEDURE — 3074F PR MOST RECENT SYSTOLIC BLOOD PRESSURE < 130 MM HG: ICD-10-PCS | Mod: ,,, | Performed by: FAMILY MEDICINE

## 2019-02-12 PROCEDURE — 3008F BODY MASS INDEX DOCD: CPT | Mod: ,,, | Performed by: FAMILY MEDICINE

## 2019-02-12 PROCEDURE — 3078F DIAST BP <80 MM HG: CPT | Mod: ,,, | Performed by: FAMILY MEDICINE

## 2019-02-12 PROCEDURE — 1101F PT FALLS ASSESS-DOCD LE1/YR: CPT | Mod: ,,, | Performed by: FAMILY MEDICINE

## 2019-02-12 PROCEDURE — 1101F PR PT FALLS ASSESS DOC 0-1 FALLS W/OUT INJ PAST YR: ICD-10-PCS | Mod: ,,, | Performed by: FAMILY MEDICINE

## 2019-02-12 PROCEDURE — 3074F SYST BP LT 130 MM HG: CPT | Mod: ,,, | Performed by: FAMILY MEDICINE

## 2019-02-12 PROCEDURE — 3078F PR MOST RECENT DIASTOLIC BLOOD PRESSURE < 80 MM HG: ICD-10-PCS | Mod: ,,, | Performed by: FAMILY MEDICINE

## 2019-02-12 PROCEDURE — 3046F HEMOGLOBIN A1C LEVEL >9.0%: CPT | Mod: ,,, | Performed by: FAMILY MEDICINE

## 2019-02-12 PROCEDURE — 3046F PR MOST RECENT HEMOGLOBIN A1C LEVEL > 9.0%: ICD-10-PCS | Mod: ,,, | Performed by: FAMILY MEDICINE

## 2019-02-12 PROCEDURE — 3008F PR BODY MASS INDEX (BMI) DOCUMENTED: ICD-10-PCS | Mod: ,,, | Performed by: FAMILY MEDICINE

## 2019-02-12 RX ORDER — ATORVASTATIN CALCIUM 40 MG/1
40 TABLET, FILM COATED ORAL DAILY
Refills: 3 | COMMUNITY
Start: 2019-02-07 | End: 2019-02-12

## 2019-02-12 RX ORDER — CYCLOBENZAPRINE HCL 10 MG
10 TABLET ORAL
COMMUNITY
Start: 2019-02-09 | End: 2019-02-12 | Stop reason: SDUPTHER

## 2019-02-12 RX ORDER — IBUPROFEN 600 MG/1
TABLET ORAL
Qty: 270 TABLET | Refills: 3 | OUTPATIENT
Start: 2019-02-12

## 2019-02-12 RX ORDER — ATORVASTATIN CALCIUM 40 MG/1
40 TABLET, FILM COATED ORAL DAILY
Qty: 90 TABLET | Refills: 3 | Status: SHIPPED | OUTPATIENT
Start: 2019-02-12 | End: 2020-02-12

## 2019-02-12 RX ORDER — CYCLOBENZAPRINE HCL 10 MG
10 TABLET ORAL 3 TIMES DAILY
Qty: 30 TABLET | Refills: 1 | Status: SHIPPED | OUTPATIENT
Start: 2019-02-12 | End: 2019-09-21 | Stop reason: SDUPTHER

## 2019-02-12 RX ORDER — NAPROXEN 500 MG/1
500 TABLET ORAL 2 TIMES DAILY
Refills: 3 | COMMUNITY
Start: 2019-02-07 | End: 2019-02-12 | Stop reason: SDUPTHER

## 2019-02-12 RX ORDER — IBUPROFEN 600 MG/1
600 TABLET ORAL 3 TIMES DAILY
Qty: 30 TABLET | Refills: 3 | Status: SHIPPED | OUTPATIENT
Start: 2019-02-12 | End: 2019-09-21 | Stop reason: SDUPTHER

## 2019-02-12 RX ORDER — ERGOCALCIFEROL 1.25 MG/1
50000 CAPSULE ORAL WEEKLY
Refills: 3 | COMMUNITY
Start: 2019-02-07

## 2019-02-12 RX ORDER — DOXYCYCLINE HYCLATE 100 MG
100 TABLET ORAL 2 TIMES DAILY
COMMUNITY
End: 2019-02-12 | Stop reason: SDUPTHER

## 2019-02-12 RX ORDER — IBUPROFEN 600 MG/1
600 TABLET ORAL
COMMUNITY
Start: 2019-02-09 | End: 2019-02-12 | Stop reason: SDUPTHER

## 2019-02-12 RX ORDER — DOXYCYCLINE HYCLATE 100 MG
100 TABLET ORAL 2 TIMES DAILY
Qty: 180 TABLET | Refills: 3 | Status: ON HOLD | OUTPATIENT
Start: 2019-02-12 | End: 2019-11-29 | Stop reason: HOSPADM

## 2019-02-12 NOTE — TELEPHONE ENCOUNTER
The following medication needs a prior authorization:     Medication Name: cyclobenzaprine    Dosage: 10 mg    Frequency: daily     Directions for use: TID     Diagnosis: fall with leg and knee injury    Is the request for a reauthorization? No     Is the patient currently stable on therapy? New therapy    Please list all therapeutic alternatives previously used with start/end dates and outcome     aspirin

## 2019-03-11 ENCOUNTER — TELEPHONE (OUTPATIENT)
Dept: FAMILY MEDICINE | Facility: CLINIC | Age: 64
End: 2019-03-11

## 2019-03-11 DIAGNOSIS — E11.65 TYPE 2 DIABETES MELLITUS WITH HYPERGLYCEMIA, WITHOUT LONG-TERM CURRENT USE OF INSULIN: Primary | ICD-10-CM

## 2019-03-11 NOTE — TELEPHONE ENCOUNTER
Left a message for the patient to got to [facility] and have her A1c drawn. I informed her that the order would be ready tomorrow.

## 2019-09-18 ENCOUNTER — TELEPHONE (OUTPATIENT)
Dept: FAMILY MEDICINE | Facility: CLINIC | Age: 64
End: 2019-09-18

## 2019-09-21 ENCOUNTER — HOSPITAL ENCOUNTER (EMERGENCY)
Facility: HOSPITAL | Age: 64
Discharge: HOME | End: 2019-09-21
Attending: EMERGENCY MEDICINE
Payer: MEDICARE

## 2019-09-21 VITALS
WEIGHT: 230 LBS | OXYGEN SATURATION: 97 % | HEIGHT: 64 IN | DIASTOLIC BLOOD PRESSURE: 72 MMHG | SYSTOLIC BLOOD PRESSURE: 160 MMHG | RESPIRATION RATE: 18 BRPM | BODY MASS INDEX: 39.27 KG/M2 | HEART RATE: 83 BPM | TEMPERATURE: 98 F

## 2019-09-21 DIAGNOSIS — S02.2XXA CLOSED FRACTURE OF NASAL BONE, INITIAL ENCOUNTER: Primary | ICD-10-CM

## 2019-09-21 PROCEDURE — 99284 EMERGENCY DEPT VISIT MOD MDM: CPT | Mod: 25

## 2019-09-21 PROCEDURE — 63600175 PHARM REV CODE 636 W HCPCS: Performed by: EMERGENCY MEDICINE

## 2019-09-21 PROCEDURE — 90714 TD VACC NO PRESV 7 YRS+ IM: CPT | Performed by: EMERGENCY MEDICINE

## 2019-09-21 PROCEDURE — 25000003 PHARM REV CODE 250: Performed by: EMERGENCY MEDICINE

## 2019-09-21 PROCEDURE — 90471 IMMUNIZATION ADMIN: CPT | Performed by: EMERGENCY MEDICINE

## 2019-09-21 RX ORDER — METHOCARBAMOL 500 MG/1
1000 TABLET, FILM COATED ORAL 3 TIMES DAILY
Qty: 30 TABLET | Refills: 0 | Status: SHIPPED | OUTPATIENT
Start: 2019-09-21 | End: 2019-09-26

## 2019-09-21 RX ORDER — MUPIROCIN 20 MG/G
1 OINTMENT TOPICAL
Status: COMPLETED | OUTPATIENT
Start: 2019-09-21 | End: 2019-09-21

## 2019-09-21 RX ORDER — CEPHALEXIN 500 MG/1
500 CAPSULE ORAL 4 TIMES DAILY
Qty: 20 CAPSULE | Refills: 0 | Status: SHIPPED | OUTPATIENT
Start: 2019-09-21 | End: 2019-09-26

## 2019-09-21 RX ORDER — NAPROXEN 500 MG/1
500 TABLET ORAL 2 TIMES DAILY WITH MEALS
Qty: 30 TABLET | Refills: 0 | Status: ON HOLD | OUTPATIENT
Start: 2019-09-21 | End: 2019-11-29 | Stop reason: HOSPADM

## 2019-09-21 RX ADMIN — MUPIROCIN 22 G: 20 OINTMENT TOPICAL at 09:09

## 2019-09-21 RX ADMIN — TETANUS AND DIPHTHERIA TOXOIDS ADSORBED 0.5 ML: 2; 2 INJECTION INTRAMUSCULAR at 09:09

## 2019-09-22 NOTE — ED PROVIDER NOTES
Encounter Date: 9/21/2019       History     Chief Complaint   Patient presents with    Fall     STATES I THINK I BROKE MY NOSE.. NO LOC. NO BLD THINNERS     64-year-old female presents complaining of fall, patient reports that she was ambulating from her garage into her home and she tripped over a low step, patient fell on to her face and injured her nose.  Patient reports that she did not lose consciousness but she was dazed.  Patient reports some minor pain to bilateral knees otherwise she has no complaints. Patient reports no other injury        Review of patient's allergies indicates:  No Known Allergies  Past Medical History:   Diagnosis Date    Diabetes mellitus     GERD (gastroesophageal reflux disease)     HLD (hyperlipidemia)     Hypertension      Past Surgical History:   Procedure Laterality Date    COLON SURGERY      tumor removed from colon    CYSTOSCOPY N/A 12/10/2018    Performed by [facility]id [facility] at [facility] OR    INCISION AND DRAINAGE (I&D), ABSCESS Left 4/1/2016    Performed by Joshua Goldberg, MD at [facility] OR 2ND FLR    INCISION AND DRAINAGE (I&D), BREAST Right 4/1/2016    Performed by Joshua Goldberg, MD at [facility] OR 2ND FLR    ROTATOR CUFF REPAIR  2005    Right     No family history on file.  Social History     Tobacco Use    Smoking status: Never Smoker    Smokeless tobacco: Never Used   Substance Use Topics    Alcohol use: Yes     Comment: socially     Drug use: No     Review of Systems   Constitutional: Negative for fever.   HENT: Positive for facial swelling, nosebleeds and sinus pain. Negative for congestion, rhinorrhea, sore throat and trouble swallowing.    Eyes: Negative for visual disturbance.   Respiratory: Negative for cough, chest tightness, shortness of breath and wheezing.    Cardiovascular: Negative for chest pain, palpitations and leg swelling.   Gastrointestinal: Negative for abdominal distention, abdominal pain, constipation, diarrhea, nausea and vomiting.    Genitourinary: Negative for difficulty urinating, dysuria, flank pain and frequency.   Musculoskeletal: Negative for arthralgias, back pain, joint swelling and neck pain.   Skin: Negative for color change and rash.   Neurological: Negative for dizziness, syncope, speech difficulty, weakness, numbness and headaches.   All other systems reviewed and are negative.      Physical Exam     Initial Vitals [09/21/19 1912]   BP Pulse Resp Temp SpO2   (!) 202/116 103 18 98.3 °F (36.8 °C) 98 %      MAP       --         Physical Exam    Nursing note and vitals reviewed.  Constitutional: She appears well-developed and well-nourished. She is not diaphoretic. No distress.   HENT:   Head: Normocephalic.   Right Ear: External ear normal.   Left Ear: External ear normal.   Mouth/Throat: Oropharynx is clear and moist. No oropharyngeal exudate.   Patient has swelling and tenderness to the nose there is no septal hematoma no sign of CSF rhinorrhea patient has no mid face instability no sign of Wong sign, no raccoon's eyes.   Eyes: Conjunctivae and EOM are normal. Pupils are equal, round, and reactive to light. Right eye exhibits no discharge. Left eye exhibits no discharge. No scleral icterus.   Neck: Normal range of motion. Neck supple. No thyromegaly present. No tracheal deviation present. No JVD present.   Cardiovascular: Normal rate, regular rhythm, normal heart sounds and intact distal pulses. Exam reveals no gallop and no friction rub.    No murmur heard.  Pulmonary/Chest: Breath sounds normal. No stridor. No respiratory distress. She has no wheezes. She has no rhonchi. She has no rales. She exhibits no tenderness.   Abdominal: Soft. Bowel sounds are normal. She exhibits no distension and no mass. There is no tenderness. There is no rebound and no guarding.   Musculoskeletal: Normal range of motion. She exhibits no edema or tenderness.   Lymphadenopathy:     She has no cervical adenopathy.   Neurological: She is alert and  oriented to person, place, and time. She has normal strength. She displays normal reflexes. No cranial nerve deficit or sensory deficit.   Skin: Skin is warm and dry. No rash and no abscess noted. No erythema. No pallor.         ED Course   Procedures  Labs Reviewed - No data to display       Imaging Results          CT Maxillofacial Without Contrast (Final result)  Result time 09/21/19 20:28:55    Final result by Marcia Blount MD (09/21/19 20:28:55)                 Narrative:    CMS MANDATED QUALITY DATA - CT RADIATION 436. CT scans at this facility utilize dose modulation, iterative reconstruction, and/or weight based dosing when appropriate to reduce radiation dose to as low as reasonably achievable.    Procedure:  CT MAXILLOFACIAL WITHOUT CONTRAST  dated  9/21/2019 8:08 PM    CLINICAL HISTORY:   Female 64 years of age.   Maxface trauma blunt.busted nose in fall    COMPARISON:  None    TECHNIQUE: Contiguous CT images were acquired through the facial bones. Intravenous contrast was not administered.    FINDINGS:    There are bilateral nasal bone fractures. The both nasal bones are fractured anteriorly and posteriorly. The posterior fracture of the right nasal bone is medially and proximally displaced by 1 mm. The left nasal bone posterior fracture is laterally approximately displaced by 1 mm. Nasal septum is mildly deviated to the right but appears to be intact. There is mild mucosal thickening of anterior ethmoid air cells. Paranasal sinuses are otherwise clear. There is advanced arthropathy of the left temporomandibular joint.    IMPRESSION:    Bilateral displaced nasal bone fractures.      Electronically Signed by Marcia Blount on 9/21/2019 8:51 PM                             CT Cervical Spine Without Contrast (Final result)  Result time 09/21/19 20:22:57    Final result by Marcia Blount MD (09/21/19 20:22:57)                 Narrative:    CMS MANDATED QUALITY DATA - CT RADIATION 436. CT scans at this  facility utilize dose modulation, iterative reconstruction, and/or weight based dosing when appropriate to reduce radiation dose to as low as reasonably achievable.    Procedure:  CT CERVICAL SPINE WITHOUT CONTRAST  dated  9/21/2019 8:08 PM    CLINICAL HISTORY:   Female 64 years of age.   C-spine trauma, NEXUS/CCR positive, +risk factor(s).fell hit face in fall    COMPARISON:  None    TECHNIQUE/FINDINGS:  Axial CT examination of the cervical spine was performed. Axial, sagittal and coronal reformatted images were generated.    The cervical alignment is anatomic with no fracture, subluxation or facet dislocation. There is no prevertebral soft tissue swelling.    Facet arthropathy is present throughout the cervical spine. The C2-C3 right facet joint is fused. Disc height is mildly decreased at C2-C3 through C4-C5 and markedly decreased at C5-C6 through C7-T1. Degenerative changes of the endplate are greatest at C5-C6 and C6-C7 where the endplates are osteophytic, sclerotic and irregular, and bilateral neural foramina are narrowed. At C3-C4 is posterior disc extrusion with cephalad migration of disc material. C3-C4 right neural foramen is narrowed. At C4-C5 and C5-C6 is broad-based disc bulging. C6-C7 left neural foramen is narrowed. Central canal is mildly to moderately narrowed at C3-C4, C4-C5, and moderately narrowed at C5-C6 and C6-C7.    IMPRESSION:  No acute findings.  Degenerative changes throughout the spine. Central canal and neural foraminal narrowing as detailed.    Electronically Signed by Marcia Blount on 9/21/2019 8:46 PM                             CT Head Without Contrast (Final result)  Result time 09/21/19 20:22:31    Final result by Marcia Blount MD (09/21/19 20:22:31)                 Narrative:    CMS MANDATED QUALITY DATA - CT RADIATION 436. CT scans at this facility utilize dose modulation, iterative reconstruction, and/or weight based dosing when appropriate to reduce radiation dose to as low  as reasonably achievable.    PROCEDURE:    CT HEAD WITHOUT CONTRAST  dated  9/21/2019 8:08 PM    CLINICAL HISTORY:   Female 64 years of age.   Facial trauma, fx suspected.fell    TECHNIQUE: CT images were acquired from the foramen magnum to the vertex. Intravenous contrast was not administered.    COMPARISON:  None    FINDINGS:    There is no intracranial hemorrhage, extra-axial fluid collection or edema. Ventricles, cisterns and sulci are age-appropriate.  Mastoid air cells are clear.  Visualized paranasal sinuses are clear.    Extracranial soft tissue is normal. The calvarium is intact. There are bilateral mildly displaced nasal bone fractures.    IMPRESSION:    No intracranial bleed or edema. Bilateral mildly displaced nasal bone fractures    Electronically Signed by Marcia Blount on 9/21/2019 8:28 PM                               Medical Decision Making:   History:   Old Medical Records: I decided to obtain old medical records.  Initial Assessment:   Emergent evaluation of 64-year-old female presenting with nasal swelling and tenderness differential diagnosis includes fracture, soft tissue injury, intracranial injury.  Patient does not recall the date of her last tetanus will update tetanus in the ER.              Attending Attestation:             Attending ED Notes:   Patient CT shows bilateral nasal bone fracture there is minimal displacement, patient reports she is able to breathe out of bilateral nostrils.  Patient tetanus updated in the ER.  Patient will be given pain medication and started on a course of Keflex patient to follow up with ENT in the next 2-3 days for re-evaluation and placed on sinus precautions she is cautioned to return immediately to the ER for any worsening or any further concerns             Clinical Impression:       ICD-10-CM ICD-9-CM   1. Closed fracture of nasal bone, initial encounter S02.2XXA 802.0                                Eric Cohn MD  09/21/19 3777

## 2019-09-22 NOTE — ED NOTES
Pt connected to continuous pulse ox & BP. Bed rails are up and call light is within patient reach.

## 2019-11-27 ENCOUNTER — HOSPITAL ENCOUNTER (INPATIENT)
Facility: HOSPITAL | Age: 64
LOS: 2 days | Discharge: HOME | DRG: 395 | End: 2019-11-29
Attending: EMERGENCY MEDICINE | Admitting: INTERNAL MEDICINE
Payer: MEDICARE

## 2019-11-27 DIAGNOSIS — I10 ACCELERATED HYPERTENSION: ICD-10-CM

## 2019-11-27 DIAGNOSIS — R10.9 ABDOMINAL PAIN: ICD-10-CM

## 2019-11-27 DIAGNOSIS — I20.89 ANGINA AT REST: ICD-10-CM

## 2019-11-27 DIAGNOSIS — R07.9 CHEST PAIN: ICD-10-CM

## 2019-11-27 DIAGNOSIS — R07.89 OTHER CHEST PAIN: ICD-10-CM

## 2019-11-27 DIAGNOSIS — E78.5 HYPERLIPIDEMIA: ICD-10-CM

## 2019-11-27 DIAGNOSIS — K62.5 RECTAL BLEEDING: ICD-10-CM

## 2019-11-27 DIAGNOSIS — E11.9 DIABETES MELLITUS: ICD-10-CM

## 2019-11-27 DIAGNOSIS — K50.111: ICD-10-CM

## 2019-11-27 DIAGNOSIS — E66.01 MORBID OBESITY: ICD-10-CM

## 2019-11-27 DIAGNOSIS — K52.9 COLITIS: ICD-10-CM

## 2019-11-27 DIAGNOSIS — K92.2 LOWER GI BLEED: Primary | ICD-10-CM

## 2019-11-27 LAB
ABO + RH BLD: NORMAL
ALBUMIN SERPL BCP-MCNC: 4.6 G/DL (ref 3.5–5.2)
ALP SERPL-CCNC: 92 U/L (ref 55–135)
ALT SERPL W/O P-5'-P-CCNC: 30 U/L (ref 10–44)
AMYLASE SERPL-CCNC: 30 U/L (ref 20–110)
ANION GAP SERPL CALC-SCNC: 12 MMOL/L (ref 8–16)
APTT PPP: 27.5 SEC (ref 23.6–33.3)
AST SERPL-CCNC: 29 U/L (ref 10–40)
BACTERIA #/AREA URNS HPF: ABNORMAL /HPF
BASOPHILS # BLD AUTO: 0.03 K/UL (ref 0–0.2)
BASOPHILS NFR BLD: 0.2 % (ref 0–1.9)
BILIRUB SERPL-MCNC: 1.4 MG/DL (ref 0.1–1)
BILIRUB UR QL STRIP: NEGATIVE
BLD GP AB SCN CELLS X3 SERPL QL: NORMAL
BUN SERPL-MCNC: 11 MG/DL (ref 8–23)
CALCIUM SERPL-MCNC: 9.1 MG/DL (ref 8.7–10.5)
CHLORIDE SERPL-SCNC: 97 MMOL/L (ref 95–110)
CK MB SERPL-MCNC: 2.5 NG/ML (ref 0.1–6.5)
CK SERPL-CCNC: 76 U/L (ref 20–180)
CLARITY UR: CLEAR
CO2 SERPL-SCNC: 23 MMOL/L (ref 23–29)
COLOR UR: YELLOW
CREAT SERPL-MCNC: 0.5 MG/DL (ref 0.5–1.4)
DIFFERENTIAL METHOD: ABNORMAL
EOSINOPHIL # BLD AUTO: 0 K/UL (ref 0–0.5)
EOSINOPHIL NFR BLD: 0.2 % (ref 0–8)
ERYTHROCYTE [DISTWIDTH] IN BLOOD BY AUTOMATED COUNT: 12.1 % (ref 11.5–14.5)
EST. GFR  (AFRICAN AMERICAN): >60 ML/MIN/1.73 M^2
EST. GFR  (NON AFRICAN AMERICAN): >60 ML/MIN/1.73 M^2
GLUCOSE SERPL-MCNC: 324 MG/DL (ref 70–110)
GLUCOSE UR QL STRIP: ABNORMAL
HCT VFR BLD AUTO: 47.9 % (ref 37–48.5)
HGB BLD-MCNC: 16.9 G/DL (ref 12–16)
HGB UR QL STRIP: ABNORMAL
HYALINE CASTS #/AREA URNS LPF: 4 /LPF
IMM GRANULOCYTES # BLD AUTO: 0.04 K/UL (ref 0–0.04)
IMM GRANULOCYTES NFR BLD AUTO: 0.3 % (ref 0–0.5)
INR PPP: 1
KETONES UR QL STRIP: ABNORMAL
LEUKOCYTE ESTERASE UR QL STRIP: NEGATIVE
LIPASE SERPL-CCNC: 29 U/L (ref 4–60)
LYMPHOCYTES # BLD AUTO: 1.1 K/UL (ref 1–4.8)
LYMPHOCYTES NFR BLD: 9.1 % (ref 18–48)
MAGNESIUM SERPL-MCNC: 1.7 MG/DL (ref 1.6–2.6)
MCH RBC QN AUTO: 29.3 PG (ref 27–31)
MCHC RBC AUTO-ENTMCNC: 35.3 G/DL (ref 32–36)
MCV RBC AUTO: 83 FL (ref 82–98)
MICROSCOPIC COMMENT: ABNORMAL
MONOCYTES # BLD AUTO: 0.4 K/UL (ref 0.3–1)
MONOCYTES NFR BLD: 3.1 % (ref 4–15)
NEUTROPHILS # BLD AUTO: 10.9 K/UL (ref 1.8–7.7)
NEUTROPHILS NFR BLD: 87.1 % (ref 38–73)
NITRITE UR QL STRIP: NEGATIVE
NRBC BLD-RTO: 0 /100 WBC
OB PNL STL: POSITIVE
PH UR STRIP: 7 [PH] (ref 5–8)
PLATELET # BLD AUTO: 274 K/UL (ref 150–350)
PMV BLD AUTO: 10.8 FL (ref 9.2–12.9)
POTASSIUM SERPL-SCNC: 3.5 MMOL/L (ref 3.5–5.1)
PROT SERPL-MCNC: 8.2 G/DL (ref 6–8.4)
PROT UR QL STRIP: ABNORMAL
PROTHROMBIN TIME: 12.7 SEC (ref 10.6–14.8)
RBC # BLD AUTO: 5.76 M/UL (ref 4–5.4)
RBC #/AREA URNS HPF: 2 /HPF (ref 0–4)
SODIUM SERPL-SCNC: 132 MMOL/L (ref 136–145)
SP GR UR STRIP: >1.03 (ref 1–1.03)
SQUAMOUS #/AREA URNS HPF: 2 /HPF
TROPONIN I SERPL DL<=0.01 NG/ML-MCNC: <0.03 NG/ML (ref 0.02–0.04)
URN SPEC COLLECT METH UR: ABNORMAL
UROBILINOGEN UR STRIP-ACNC: NEGATIVE EU/DL
WBC # BLD AUTO: 12.48 K/UL (ref 3.9–12.7)
WBC #/AREA URNS HPF: 10 /HPF (ref 0–5)
YEAST URNS QL MICRO: ABNORMAL

## 2019-11-27 PROCEDURE — 81001 URINALYSIS AUTO W/SCOPE: CPT

## 2019-11-27 PROCEDURE — 83735 ASSAY OF MAGNESIUM: CPT

## 2019-11-27 PROCEDURE — 96375 TX/PRO/DX INJ NEW DRUG ADDON: CPT

## 2019-11-27 PROCEDURE — 84484 ASSAY OF TROPONIN QUANT: CPT

## 2019-11-27 PROCEDURE — 25500020 PHARM REV CODE 255: Performed by: EMERGENCY MEDICINE

## 2019-11-27 PROCEDURE — 82150 ASSAY OF AMYLASE: CPT

## 2019-11-27 PROCEDURE — 63600175 PHARM REV CODE 636 W HCPCS: Performed by: EMERGENCY MEDICINE

## 2019-11-27 PROCEDURE — 85610 PROTHROMBIN TIME: CPT

## 2019-11-27 PROCEDURE — 82553 CREATINE MB FRACTION: CPT

## 2019-11-27 PROCEDURE — 99285 EMERGENCY DEPT VISIT HI MDM: CPT | Mod: 25

## 2019-11-27 PROCEDURE — 96374 THER/PROPH/DIAG INJ IV PUSH: CPT

## 2019-11-27 PROCEDURE — 85730 THROMBOPLASTIN TIME PARTIAL: CPT

## 2019-11-27 PROCEDURE — 63600175 PHARM REV CODE 636 W HCPCS: Performed by: INTERNAL MEDICINE

## 2019-11-27 PROCEDURE — 12000002 HC ACUTE/MED SURGE SEMI-PRIVATE ROOM

## 2019-11-27 PROCEDURE — 80053 COMPREHEN METABOLIC PANEL: CPT

## 2019-11-27 PROCEDURE — 82272 OCCULT BLD FECES 1-3 TESTS: CPT

## 2019-11-27 PROCEDURE — 86850 RBC ANTIBODY SCREEN: CPT

## 2019-11-27 PROCEDURE — 85025 COMPLETE CBC W/AUTO DIFF WBC: CPT

## 2019-11-27 PROCEDURE — 93005 ELECTROCARDIOGRAM TRACING: CPT

## 2019-11-27 PROCEDURE — 82550 ASSAY OF CK (CPK): CPT

## 2019-11-27 PROCEDURE — 83690 ASSAY OF LIPASE: CPT

## 2019-11-27 RX ORDER — HYDROMORPHONE HYDROCHLORIDE 1 MG/ML
1 INJECTION, SOLUTION INTRAMUSCULAR; INTRAVENOUS; SUBCUTANEOUS
Status: COMPLETED | OUTPATIENT
Start: 2019-11-27 | End: 2019-11-27

## 2019-11-27 RX ORDER — PREDNISONE 2.5 MG/1
2.5 TABLET ORAL DAILY
COMMUNITY

## 2019-11-27 RX ORDER — METRONIDAZOLE 500 MG/100ML
500 INJECTION, SOLUTION INTRAVENOUS
Status: DISCONTINUED | OUTPATIENT
Start: 2019-11-27 | End: 2019-11-27

## 2019-11-27 RX ORDER — HYDROMORPHONE HYDROCHLORIDE 1 MG/ML
0.5 INJECTION, SOLUTION INTRAMUSCULAR; INTRAVENOUS; SUBCUTANEOUS EVERY 4 HOURS PRN
Status: DISCONTINUED | OUTPATIENT
Start: 2019-11-27 | End: 2019-11-29 | Stop reason: HOSPADM

## 2019-11-27 RX ORDER — ONDANSETRON 2 MG/ML
4 INJECTION INTRAMUSCULAR; INTRAVENOUS
Status: COMPLETED | OUTPATIENT
Start: 2019-11-27 | End: 2019-11-27

## 2019-11-27 RX ORDER — ONDANSETRON 2 MG/ML
4 INJECTION INTRAMUSCULAR; INTRAVENOUS EVERY 6 HOURS PRN
Status: DISCONTINUED | OUTPATIENT
Start: 2019-11-27 | End: 2019-11-29 | Stop reason: HOSPADM

## 2019-11-27 RX ORDER — SODIUM CHLORIDE 9 MG/ML
INJECTION, SOLUTION INTRAVENOUS CONTINUOUS
Status: DISCONTINUED | OUTPATIENT
Start: 2019-11-27 | End: 2019-11-29 | Stop reason: HOSPADM

## 2019-11-27 RX ORDER — LEVOFLOXACIN 5 MG/ML
500 INJECTION, SOLUTION INTRAVENOUS
Status: DISCONTINUED | OUTPATIENT
Start: 2019-11-27 | End: 2019-11-27

## 2019-11-27 RX ADMIN — PIPERACILLIN AND TAZOBACTAM 3.38 G: 3; .375 INJECTION, POWDER, LYOPHILIZED, FOR SOLUTION INTRAVENOUS; PARENTERAL at 01:11

## 2019-11-27 RX ADMIN — IOHEXOL 100 ML: 350 INJECTION, SOLUTION INTRAVENOUS at 11:11

## 2019-11-27 RX ADMIN — PIPERACILLIN AND TAZOBACTAM 3.38 G: 3; .375 INJECTION, POWDER, LYOPHILIZED, FOR SOLUTION INTRAVENOUS; PARENTERAL at 09:11

## 2019-11-27 RX ADMIN — ONDANSETRON 4 MG: 2 INJECTION INTRAMUSCULAR; INTRAVENOUS at 10:11

## 2019-11-27 RX ADMIN — SODIUM CHLORIDE: 0.9 INJECTION, SOLUTION INTRAVENOUS at 12:11

## 2019-11-27 RX ADMIN — HYDROMORPHONE HYDROCHLORIDE 1 MG: 1 INJECTION, SOLUTION INTRAMUSCULAR; INTRAVENOUS; SUBCUTANEOUS at 10:11

## 2019-11-27 NOTE — HPI
64 year old female presented to ED complaining of 12-18 hours of central to LLQ abdominal pain constant 7-8/10 sharp stabbing. She started to have bright red blood per rectum this AM. She has had bright red blood pr in ED. No N/V. No CP/SOB. Labs personally reviewed H/H currently is 16/47. She has DM2 with glucose of 324. EKG personally reviewed = sinus tach without ischemic changes. Patient personally discussed with ED physician will admit, NPO, GI consult, insulin sliding scale

## 2019-11-27 NOTE — H&P
"[facility]  History & Physical    Patient Name: Yoshi Lennon  MRN: [id number]  Admission Date: 11/27/2019  Attending Physician: Antonino Roque MD  Primary Care Provider: Primary Doctor No         Patient information was obtained from patient and ER records.     Subjective:     Principal Problem:Rectal bleed    Chief Complaint:   Chief Complaint   Patient presents with    Rectal Bleeding     rectal bleeding started last pm, states lower abdominal pain now up into epigastric        HPI: 64 year old female presented to ED complaining of 12-18 hours of central to LLQ abdominal pain constant 7-8/10 sharp stabbing. She started to have bright red blood per rectum this AM. She has had bright red blood pr in ED. No N/V. No CP/SOB. Labs personally reviewed H/H currently is 16/47. She has DM2 with glucose of 324. EKG personally reviewed = sinus tach without ischemic changes. Patient personally discussed with ED physician will admit, NPO, GI consult, insulin sliding scale   Patient states she had "at least a half a dozen or more" bloody BMs/diarrhea overnight and this AM  Addendum: CT Abdo resulted as colitis. Will add Zosyn to regimen    Past Medical History:   Diagnosis Date    Diabetes mellitus     GERD (gastroesophageal reflux disease)     HLD (hyperlipidemia)     Hypertension        Past Surgical History:   Procedure Laterality Date    COLON SURGERY      tumor removed from colon    CYSTOSCOPY N/A 12/10/2018    Procedure: CYSTOSCOPY;  Surgeon: Astrid Muhammad MD;  Location: [facility] OR;  Service: Urology;  Laterality: N/A;    ROTATOR CUFF REPAIR  2005    Right       Review of patient's allergies indicates:  No Known Allergies    No current facility-administered medications on file prior to encounter.      Current Outpatient Medications on File Prior to Encounter   Medication Sig    aspirin 81 MG Chew Take 1 tablet (81 mg total) by mouth once daily.    doxycycline (VIBRA-TABS) " 100 MG tablet Take 1 tablet (100 mg total) by mouth 2 (two) times daily.    naproxen (NAPROSYN) 500 MG tablet Take 1 tablet (500 mg total) by mouth 2 (two) times daily with meals.    olmesartan-hydrochlorothiazide (BENICAR HCT) 40-25 mg per tablet Take 1 tablet by mouth once daily.    omeprazole (PRILOSEC) 20 MG capsule Take 1 capsule (20 mg total) by mouth once daily.    predniSONE (DELTASONE) 2.5 MG tablet Take 2.5 mg by mouth once daily.    atorvastatin (LIPITOR) 40 MG tablet Take 1 tablet (40 mg total) by mouth once daily.    blood sugar diagnostic Strp 1 strip by Misc.(Non-Drug; Combo Route) route once daily.    blood-glucose meter (TRUE METRIX GLUCOSE METER) Misc 1 Units by Misc.(Non-Drug; Combo Route) route once daily. Please check blood sugar while fasting (fisrt thing in the morning)    ergocalciferol (ERGOCALCIFEROL) 50,000 unit Cap Take 50,000 Units by mouth once a week.    lancets 33 gauge Misc 1 lancet by Misc.(Non-Drug; Combo Route) route 4 (four) times daily.    metFORMIN (GLUCOPHAGE) 1000 MG tablet Take 1 tablet (1,000 mg total) by mouth 2 (two) times daily with meals.     Family History     None        Tobacco Use    Smoking status: Never Smoker    Smokeless tobacco: Never Used   Substance and Sexual Activity    Alcohol use: Yes     Comment: socially     Drug use: No    Sexual activity: Yes     Partners: Male     Review of Systems   Constitutional: Negative.    HENT: Negative.    Eyes: Negative.    Respiratory: Negative.    Cardiovascular: Negative.    Gastrointestinal: Positive for abdominal pain, anal bleeding and blood in stool.   Endocrine: Negative.    Genitourinary: Negative.    Musculoskeletal: Negative.    Skin: Negative.    Allergic/Immunologic: Negative.    Neurological: Negative.    Hematological: Negative.    All other systems reviewed and are negative.    Objective:     Vital Signs (Most Recent):  Temp: 97.8 °F (36.6 °C) (11/27/19 1004)  Pulse: 103 (11/27/19 1004)  Resp:  20 (11/27/19 1004)  BP: (!) 223/117 (11/27/19 1004)  SpO2: 96 % (11/27/19 1004) Vital Signs (24h Range):  Temp:  [97.8 °F (36.6 °C)] 97.8 °F (36.6 °C)  Pulse:  [103] 103  Resp:  [20] 20  SpO2:  [96 %] 96 %  BP: (223)/(117) 223/117     Weight: 104.3 kg (230 lb)  Body mass index is 39.48 kg/m².    Physical Exam   Constitutional: She is oriented to person, place, and time. She appears well-developed and well-nourished.   HENT:   Head: Normocephalic and atraumatic.   Right Ear: External ear normal.   Left Ear: External ear normal.   Nose: Nose normal.   Mouth/Throat: Oropharynx is clear and moist.   Eyes: Pupils are equal, round, and reactive to light. Conjunctivae and EOM are normal.   Neck: Normal range of motion. Neck supple.   Cardiovascular: Normal rate, regular rhythm, normal heart sounds and intact distal pulses.   Pulmonary/Chest: Effort normal and breath sounds normal.   Decreased entry without adventitious sounds   Abdominal: Soft. Bowel sounds are normal.   Musculoskeletal: Normal range of motion.   Neurological: She is alert and oriented to person, place, and time.   Skin: Skin is warm and dry. Capillary refill takes less than 2 seconds.   Psychiatric: She has a normal mood and affect. Her behavior is normal. Judgment and thought content normal.   Nursing note and vitals reviewed.        CRANIAL NERVES     CN III, IV, VI   Pupils are equal, round, and reactive to light.  Extraocular motions are normal.        Significant Labs:   CBC:   Recent Labs   Lab 11/27/19  1034   WBC 12.48   HGB 16.9*   HCT 47.9        CMP:   Recent Labs   Lab 11/27/19  1034   *   K 3.5   CL 97   CO2 23   *   BUN 11   CREATININE 0.5   CALCIUM 9.1   PROT 8.2   ALBUMIN 4.6   BILITOT 1.4*   ALKPHOS 92   AST 29   ALT 30   ANIONGAP 12   EGFRNONAA >60.0     Urine Studies:   Recent Labs   Lab 11/27/19  1101   COLORU Yellow   APPEARANCEUA Clear   PHUR 7.0   SPECGRAV >1.030*   PROTEINUA 1+*   GLUCUA 4+*   KETONESU 2+*    BILIRUBINUA Negative   OCCULTUA Trace*   NITRITE Negative   UROBILINOGEN Negative   LEUKOCYTESUR Negative   RBCUA 2   WBCUA 10*   BACTERIA Rare   SQUAMEPITHEL 2   HYALINECASTS 4*       Significant Imaging: I have reviewed and interpreted all pertinent imaging results/findings within the past 24 hours.    Assessment/Plan:     * Rectal bleed  NPO except meds, GI consult, hydration, insulin sliding scale        VTE Risk Mitigation (From admission, onward)    None             Antonino Roque MD  Department of Hospital Medicine   [facility]

## 2019-11-27 NOTE — ED PROVIDER NOTES
Encounter Date: 11/27/2019       History     Chief Complaint   Patient presents with    Abdominal Pain     rectal bleeding started last pm, states lower abdominal pain now up into epigastric     Patient reports since last night she has been having severe low abdominal pain.  Pain radiates to epigastric area.  Patient also with large bloody bowel movement this morning.  Pain is severe in nature with no relieving factors.  Symptoms continued emergency room.  No similar symptoms in the past.  No vomiting.  No chest pain or shortness of breath.        Review of patient's allergies indicates:  No Known Allergies  Past Medical History:   Diagnosis Date    Diabetes mellitus     GERD (gastroesophageal reflux disease)     HLD (hyperlipidemia)     Hypertension      Past Surgical History:   Procedure Laterality Date    COLON SURGERY      tumor removed from colon    CYSTOSCOPY N/A 12/10/2018    Procedure: CYSTOSCOPY;  Surgeon: Astrid Muhammad MD;  Location: [facility] OR;  Service: Urology;  Laterality: N/A;    ROTATOR CUFF REPAIR  2005    Right     History reviewed. No pertinent family history.  Social History     Tobacco Use    Smoking status: Never Smoker    Smokeless tobacco: Never Used   Substance Use Topics    Alcohol use: Yes     Comment: socially     Drug use: No     Review of Systems   Constitutional: Negative for chills and fever.   HENT: Negative for congestion.    Eyes: Negative for visual disturbance.   Respiratory: Negative for shortness of breath.    Cardiovascular: Negative for chest pain and palpitations.   Gastrointestinal: Positive for abdominal pain and blood in stool. Negative for vomiting.   Genitourinary: Negative for dysuria.   Musculoskeletal: Negative for joint swelling.   Neurological: Negative for headaches.   Psychiatric/Behavioral: Negative for confusion.       Physical Exam     Initial Vitals [11/27/19 1004]   BP Pulse Resp Temp SpO2   (!) 223/117 103 20 97.8 °F (36.6 °C) 96 %       MAP       --         Physical Exam    Nursing note and vitals reviewed.  Constitutional: She is not diaphoretic. No distress.   HENT:   Head: Normocephalic and atraumatic.   Eyes: Conjunctivae are normal.   Neck: Normal range of motion.   Cardiovascular: Normal rate.   Pulmonary/Chest: Breath sounds normal.   Abdominal: Soft.   Moderate lower abdominal pain. Positive bowel sounds. No guarding or rebound   Musculoskeletal: Normal range of motion.   Neurological: She is alert.   No gross deficits   Skin: No rash noted.   Psychiatric: She has a normal mood and affect.         ED Course   Procedures  Labs Reviewed   APTT   AMYLASE   CBC W/ AUTO DIFFERENTIAL   COMPREHENSIVE METABOLIC PANEL   LIPASE   MAGNESIUM   PROTIME-INR   TROPONIN I   URINALYSIS, REFLEX TO URINE CULTURE   CK-MB   CK   OCCULT BLOOD X 1, STOOL   TYPE & SCREEN          Imaging Results    None          Medical Decision Making:   History:   Old Medical Records: I decided to obtain old medical records.  Clinical Tests:   Lab Tests: Reviewed  Radiological Study: Reviewed  Medical Tests: Reviewed  ED Management:  Patient presents with rectal bleeding and lower abdominal pain.  Clinically patient has acute colitis.  This is confirmed by CT scan.  Patient feeling better after medications here.  Hemoglobin is currently stable will begin broad-spectrum antibiotics.  Hospitalist consult for admission.  Discussed with hospitalist.  Hospitalist desire Zosyn as antibiotic of choice.  Patient originally presents with very accelerated hypertension but she was in severe pain. After pain control blood pressure has improved greatly                                 Clinical Impression:       ICD-10-CM ICD-9-CM   1. Lower GI bleed K92.2 578.9   2. Abdominal pain R10.9 789.00   3. Rectal bleeding K62.5 569.3   4. Colitis K52.9 558.9                             Alfredo Deal MD  11/27/19 1249       Alfredo Deal MD  11/27/19 1250

## 2019-11-27 NOTE — PLAN OF CARE
Problem: Infection  Goal: Infection Symptom Resolution  Outcome: Ongoing, Progressing     Problem: Adult Inpatient Plan of Care  Goal: Plan of Care Review  Outcome: Ongoing, Progressing  Goal: Patient-Specific Goal (Individualization)  Outcome: Ongoing, Progressing  Goal: Absence of Hospital-Acquired Illness or Injury  Outcome: Ongoing, Progressing  Goal: Optimal Comfort and Wellbeing  Outcome: Ongoing, Progressing  Goal: Readiness for Transition of Care  Outcome: Ongoing, Progressing  Goal: Rounds/Family Conference  Outcome: Ongoing, Progressing     Problem: Diabetes Comorbidity  Goal: Blood Glucose Level Within Desired Range  Outcome: Ongoing, Progressing

## 2019-11-27 NOTE — NURSING
Receive patient from ED via stretcher, AAOx3, resp even and unlabored,skin w/d to touch. Can express needs, oriented x 4. Continent of B/B. C/O nausea no emesis noted.  Bed low call light in reach. Charge nurse informed of admit and will do the assessment.

## 2019-11-27 NOTE — ED NOTES
To er with c/o lower, mid abd pain. Onset last pm. Pt states sharp pain with nausea noted last pm. + nausea. Pt states after a few attempts at bm was able to pass stool with blood noted. X 1 episode. abd soft, + tender lower abd, mid abd. + bowel sounds x 4 quads. Maew. Speech clear. Skin w/dr/pk. rr even/unlab. bilat breath sounds clear. On monitor.

## 2019-11-27 NOTE — SUBJECTIVE & OBJECTIVE
Past Medical History:   Diagnosis Date    Diabetes mellitus     GERD (gastroesophageal reflux disease)     HLD (hyperlipidemia)     Hypertension        Past Surgical History:   Procedure Laterality Date    COLON SURGERY      tumor removed from colon    CYSTOSCOPY N/A 12/10/2018    Procedure: CYSTOSCOPY;  Surgeon: Astrid Muhammad MD;  Location: [facility];  Service: Urology;  Laterality: N/A;    ROTATOR CUFF REPAIR  2005    Right       Review of patient's allergies indicates:  No Known Allergies    No current facility-administered medications on file prior to encounter.      Current Outpatient Medications on File Prior to Encounter   Medication Sig    aspirin 81 MG Chew Take 1 tablet (81 mg total) by mouth once daily.    doxycycline (VIBRA-TABS) 100 MG tablet Take 1 tablet (100 mg total) by mouth 2 (two) times daily.    naproxen (NAPROSYN) 500 MG tablet Take 1 tablet (500 mg total) by mouth 2 (two) times daily with meals.    olmesartan-hydrochlorothiazide (BENICAR HCT) 40-25 mg per tablet Take 1 tablet by mouth once daily.    omeprazole (PRILOSEC) 20 MG capsule Take 1 capsule (20 mg total) by mouth once daily.    predniSONE (DELTASONE) 2.5 MG tablet Take 2.5 mg by mouth once daily.    atorvastatin (LIPITOR) 40 MG tablet Take 1 tablet (40 mg total) by mouth once daily.    blood sugar diagnostic Strp 1 strip by Misc.(Non-Drug; Combo Route) route once daily.    blood-glucose meter (TRUE METRIX GLUCOSE METER) Misc 1 Units by Misc.(Non-Drug; Combo Route) route once daily. Please check blood sugar while fasting (fisrt thing in the morning)    ergocalciferol (ERGOCALCIFEROL) 50,000 unit Cap Take 50,000 Units by mouth once a week.    lancets 33 gauge Misc 1 lancet by Misc.(Non-Drug; Combo Route) route 4 (four) times daily.    metFORMIN (GLUCOPHAGE) 1000 MG tablet Take 1 tablet (1,000 mg total) by mouth 2 (two) times daily with meals.     Family History     None        Tobacco Use    Smoking status:  Never Smoker    Smokeless tobacco: Never Used   Substance and Sexual Activity    Alcohol use: Yes     Comment: socially     Drug use: No    Sexual activity: Yes     Partners: Male     Review of Systems   Constitutional: Negative.    HENT: Negative.    Eyes: Negative.    Respiratory: Negative.    Cardiovascular: Negative.    Gastrointestinal: Positive for abdominal pain, anal bleeding and blood in stool.   Endocrine: Negative.    Genitourinary: Negative.    Musculoskeletal: Negative.    Skin: Negative.    Allergic/Immunologic: Negative.    Neurological: Negative.    Hematological: Negative.    All other systems reviewed and are negative.    Objective:     Vital Signs (Most Recent):  Temp: 97.8 °F (36.6 °C) (11/27/19 1004)  Pulse: 103 (11/27/19 1004)  Resp: 20 (11/27/19 1004)  BP: (!) 223/117 (11/27/19 1004)  SpO2: 96 % (11/27/19 1004) Vital Signs (24h Range):  Temp:  [97.8 °F (36.6 °C)] 97.8 °F (36.6 °C)  Pulse:  [103] 103  Resp:  [20] 20  SpO2:  [96 %] 96 %  BP: (223)/(117) 223/117     Weight: 104.3 kg (230 lb)  Body mass index is 39.48 kg/m².    Physical Exam   Constitutional: She is oriented to person, place, and time. She appears well-developed and well-nourished.   HENT:   Head: Normocephalic and atraumatic.   Right Ear: External ear normal.   Left Ear: External ear normal.   Nose: Nose normal.   Mouth/Throat: Oropharynx is clear and moist.   Eyes: Pupils are equal, round, and reactive to light. Conjunctivae and EOM are normal.   Neck: Normal range of motion. Neck supple.   Cardiovascular: Normal rate, regular rhythm, normal heart sounds and intact distal pulses.   Pulmonary/Chest: Effort normal and breath sounds normal.   Decreased entry without adventitious sounds   Abdominal: Soft. Bowel sounds are normal.   Musculoskeletal: Normal range of motion.   Neurological: She is alert and oriented to person, place, and time.   Skin: Skin is warm and dry. Capillary refill takes less than 2 seconds.   Psychiatric:  She has a normal mood and affect. Her behavior is normal. Judgment and thought content normal.   Nursing note and vitals reviewed.        CRANIAL NERVES     CN III, IV, VI   Pupils are equal, round, and reactive to light.  Extraocular motions are normal.        Significant Labs:   CBC:   Recent Labs   Lab 11/27/19  1034   WBC 12.48   HGB 16.9*   HCT 47.9        CMP:   Recent Labs   Lab 11/27/19  1034   *   K 3.5   CL 97   CO2 23   *   BUN 11   CREATININE 0.5   CALCIUM 9.1   PROT 8.2   ALBUMIN 4.6   BILITOT 1.4*   ALKPHOS 92   AST 29   ALT 30   ANIONGAP 12   EGFRNONAA >60.0     Urine Studies:   Recent Labs   Lab 11/27/19  1101   COLORU Yellow   APPEARANCEUA Clear   PHUR 7.0   SPECGRAV >1.030*   PROTEINUA 1+*   GLUCUA 4+*   KETONESU 2+*   BILIRUBINUA Negative   OCCULTUA Trace*   NITRITE Negative   UROBILINOGEN Negative   LEUKOCYTESUR Negative   RBCUA 2   WBCUA 10*   BACTERIA Rare   SQUAMEPITHEL 2   HYALINECASTS 4*       Significant Imaging: I have reviewed and interpreted all pertinent imaging results/findings within the past 24 hours.

## 2019-11-28 PROBLEM — K50.111: Status: ACTIVE | Noted: 2019-11-28

## 2019-11-28 LAB
ALBUMIN SERPL BCP-MCNC: 4 G/DL (ref 3.5–5.2)
ALP SERPL-CCNC: 72 U/L (ref 55–135)
ALT SERPL W/O P-5'-P-CCNC: 33 U/L (ref 10–44)
ANION GAP SERPL CALC-SCNC: 10 MMOL/L (ref 8–16)
AST SERPL-CCNC: 21 U/L (ref 10–40)
BILIRUB SERPL-MCNC: 0.9 MG/DL (ref 0.1–1)
BUN SERPL-MCNC: 19 MG/DL (ref 8–23)
CALCIUM SERPL-MCNC: 9 MG/DL (ref 8.7–10.5)
CHLORIDE SERPL-SCNC: 103 MMOL/L (ref 95–110)
CO2 SERPL-SCNC: 24 MMOL/L (ref 23–29)
CREAT SERPL-MCNC: 0.6 MG/DL (ref 0.5–1.4)
CRP SERPL-MCNC: 2.32 MG/DL (ref 0–0.75)
EST. GFR  (AFRICAN AMERICAN): >60 ML/MIN/1.73 M^2
EST. GFR  (NON AFRICAN AMERICAN): >60 ML/MIN/1.73 M^2
ESTIMATED AVG GLUCOSE: 275 MG/DL (ref 68–131)
ESTIMATED AVG GLUCOSE: 275 MG/DL (ref 68–131)
GLUCOSE SERPL-MCNC: 218 MG/DL (ref 70–110)
GLUCOSE SERPL-MCNC: 240 MG/DL (ref 70–110)
GLUCOSE SERPL-MCNC: 260 MG/DL (ref 70–110)
HBA1C MFR BLD HPLC: 11.2 % (ref 4.5–6.2)
HBA1C MFR BLD HPLC: 11.2 % (ref 4.5–6.2)
POTASSIUM SERPL-SCNC: 3.4 MMOL/L (ref 3.5–5.1)
PROT SERPL-MCNC: 7.3 G/DL (ref 6–8.4)
SODIUM SERPL-SCNC: 137 MMOL/L (ref 136–145)
TROPONIN I SERPL DL<=0.01 NG/ML-MCNC: <0.03 NG/ML (ref 0.02–0.04)
TROPONIN I SERPL DL<=0.01 NG/ML-MCNC: <0.03 NG/ML (ref 0.02–0.04)

## 2019-11-28 PROCEDURE — 63600175 PHARM REV CODE 636 W HCPCS: Performed by: INTERNAL MEDICINE

## 2019-11-28 PROCEDURE — 84484 ASSAY OF TROPONIN QUANT: CPT

## 2019-11-28 PROCEDURE — 25000003 PHARM REV CODE 250: Performed by: INTERNAL MEDICINE

## 2019-11-28 PROCEDURE — 86140 C-REACTIVE PROTEIN: CPT

## 2019-11-28 PROCEDURE — 83036 HEMOGLOBIN GLYCOSYLATED A1C: CPT

## 2019-11-28 PROCEDURE — 94760 N-INVAS EAR/PLS OXIMETRY 1: CPT

## 2019-11-28 PROCEDURE — 36415 COLL VENOUS BLD VENIPUNCTURE: CPT

## 2019-11-28 PROCEDURE — 80053 COMPREHEN METABOLIC PANEL: CPT

## 2019-11-28 PROCEDURE — 12000002 HC ACUTE/MED SURGE SEMI-PRIVATE ROOM

## 2019-11-28 RX ORDER — OLMESARTAN MEDOXOMIL 20 MG/1
40 TABLET ORAL DAILY
Status: DISCONTINUED | OUTPATIENT
Start: 2019-11-28 | End: 2019-11-29 | Stop reason: HOSPADM

## 2019-11-28 RX ORDER — INSULIN ASPART 100 [IU]/ML
0-5 INJECTION, SOLUTION INTRAVENOUS; SUBCUTANEOUS EVERY 6 HOURS PRN
Status: DISCONTINUED | OUTPATIENT
Start: 2019-11-28 | End: 2019-11-29 | Stop reason: HOSPADM

## 2019-11-28 RX ORDER — GLUCAGON 1 MG
1 KIT INJECTION
Status: DISCONTINUED | OUTPATIENT
Start: 2019-11-28 | End: 2019-11-29 | Stop reason: HOSPADM

## 2019-11-28 RX ORDER — MORPHINE SULFATE 4 MG/ML
4 INJECTION, SOLUTION INTRAMUSCULAR; INTRAVENOUS
Status: DISCONTINUED | OUTPATIENT
Start: 2019-11-28 | End: 2019-11-29 | Stop reason: HOSPADM

## 2019-11-28 RX ORDER — HYDRALAZINE HYDROCHLORIDE 20 MG/ML
10 INJECTION INTRAMUSCULAR; INTRAVENOUS EVERY 6 HOURS PRN
Status: DISCONTINUED | OUTPATIENT
Start: 2019-11-28 | End: 2019-11-29 | Stop reason: HOSPADM

## 2019-11-28 RX ORDER — MORPHINE SULFATE 2 MG/ML
2 INJECTION, SOLUTION INTRAMUSCULAR; INTRAVENOUS
Status: DISCONTINUED | OUTPATIENT
Start: 2019-11-28 | End: 2019-11-29 | Stop reason: HOSPADM

## 2019-11-28 RX ORDER — HYDROCHLOROTHIAZIDE 25 MG/1
25 TABLET ORAL DAILY
Status: DISCONTINUED | OUTPATIENT
Start: 2019-11-28 | End: 2019-11-28

## 2019-11-28 RX ORDER — OLMESARTAN MEDOXOMIL AND HYDROCHLOROTHIAZIDE 40/25 40; 25 MG/1; MG/1
1 TABLET ORAL DAILY
Status: DISCONTINUED | OUTPATIENT
Start: 2019-11-28 | End: 2019-11-28

## 2019-11-28 RX ADMIN — HYDROCHLOROTHIAZIDE 25 MG: 25 TABLET ORAL at 09:11

## 2019-11-28 RX ADMIN — PIPERACILLIN AND TAZOBACTAM 3.38 G: 3; .375 INJECTION, POWDER, LYOPHILIZED, FOR SOLUTION INTRAVENOUS; PARENTERAL at 09:11

## 2019-11-28 RX ADMIN — INSULIN ASPART 2 UNITS: 100 INJECTION, SOLUTION INTRAVENOUS; SUBCUTANEOUS at 12:11

## 2019-11-28 RX ADMIN — PIPERACILLIN AND TAZOBACTAM 3.38 G: 3; .375 INJECTION, POWDER, LYOPHILIZED, FOR SOLUTION INTRAVENOUS; PARENTERAL at 12:11

## 2019-11-28 RX ADMIN — INSULIN ASPART 3 UNITS: 100 INJECTION, SOLUTION INTRAVENOUS; SUBCUTANEOUS at 05:11

## 2019-11-28 RX ADMIN — HYDROMORPHONE HYDROCHLORIDE 0.5 MG: 1 INJECTION, SOLUTION INTRAMUSCULAR; INTRAVENOUS; SUBCUTANEOUS at 12:11

## 2019-11-28 RX ADMIN — PIPERACILLIN AND TAZOBACTAM 3.38 G: 3; .375 INJECTION, POWDER, LYOPHILIZED, FOR SOLUTION INTRAVENOUS; PARENTERAL at 05:11

## 2019-11-28 RX ADMIN — OLMESARTAN MEDOXOMIL 40 MG: 20 TABLET ORAL at 09:11

## 2019-11-28 NOTE — CONSULTS
Chief Complaint:  Abdominal pain    HPI:  64-year-old female who presents to the hospital with acute onset severe left lower quadrant and suprapubic abdominal cramping sharp pain present for 2 days associated with nausea vomiting and diarrhea.  Patient develops scant bright red blood per rectum as well. She denies any fevers or chills. CT imaging done in the ER revealed transverse colon/splenic flexure colitis.  Patient reports mild-to-moderate intermittent pain better with pain medication since admission.  She is receiving IV fluids currently.  Reports normal colonoscopy 7 years ago.  No family history of colitis Crohn's colon cancer polyps.    Denies heavy NSAID use.  No foreign travel.  No sick contacts.  No raw or undercooked food.    Review of Systems:  Constitutional: No fever, weight loss  Eyes: No vision problems  ENT: No hearing problems, dysphagia  Cardiovascular: No chest pain or palpitations  Respiratory: No breathing problems or cough  GI: No diarrhea or constipation +abd pain  TRESA: No urinary symptoms  Neurologic: No tremor or headaches  Musculoskeletal: No weakness or pain  Integumentary: no rashes or lesions  Psychiatric: no depression or anxiety  Complete ROS performed and negative unless stated above in HPI    Past Medical History:   Diagnosis Date    Diabetes mellitus     GERD (gastroesophageal reflux disease)     HLD (hyperlipidemia)     Hypertension        Past Surgical History:   Procedure Laterality Date    COLON SURGERY      tumor removed from colon    CYSTOSCOPY N/A 12/10/2018    Procedure: CYSTOSCOPY;  Surgeon: Astrid Muhammad MD;  Location: [facility];  Service: Urology;  Laterality: N/A;    ROTATOR CUFF REPAIR  2005    Right       History reviewed. No pertinent family history.    Social History     Socioeconomic History    Marital status:      Spouse name: Not on file    Number of children: Not on file    Years of education: Not on file    Highest education level:  Not on file   Occupational History    Not on file   Social Needs    Financial resource strain: Not on file    Food insecurity:     Worry: Not on file     Inability: Not on file    Transportation needs:     Medical: Not on file     Non-medical: Not on file   Tobacco Use    Smoking status: Never Smoker    Smokeless tobacco: Never Used   Substance and Sexual Activity    Alcohol use: Yes     Comment: socially     Drug use: No    Sexual activity: Yes     Partners: Male   Lifestyle    Physical activity:     Days per week: Not on file     Minutes per session: Not on file    Stress: Not on file   Relationships    Social connections:     Talks on phone: Not on file     Gets together: Not on file     Attends Gnosticism service: Not on file     Active member of club or organization: Not on file     Attends meetings of clubs or organizations: Not on file     Relationship status: Not on file   Other Topics Concern    Not on file   Social History Narrative    Not on file       Review of patient's allergies indicates:  No Known Allergies    No current facility-administered medications on file prior to encounter.      Current Outpatient Medications on File Prior to Encounter   Medication Sig Dispense Refill    aspirin 81 MG Chew Take 1 tablet (81 mg total) by mouth once daily. 30 tablet 12    doxycycline (VIBRA-TABS) 100 MG tablet Take 1 tablet (100 mg total) by mouth 2 (two) times daily. 180 tablet 3    naproxen (NAPROSYN) 500 MG tablet Take 1 tablet (500 mg total) by mouth 2 (two) times daily with meals. 30 tablet 0    olmesartan-hydrochlorothiazide (BENICAR HCT) 40-25 mg per tablet Take 1 tablet by mouth once daily. 30 tablet 11    omeprazole (PRILOSEC) 20 MG capsule Take 1 capsule (20 mg total) by mouth once daily. 30 capsule 3    predniSONE (DELTASONE) 2.5 MG tablet Take 2.5 mg by mouth once daily.      atorvastatin (LIPITOR) 40 MG tablet Take 1 tablet (40 mg total) by mouth once daily. 90 tablet 3     "blood sugar diagnostic Strp 1 strip by Misc.(Non-Drug; Combo Route) route once daily. 100 strip 0    blood-glucose meter (TRUE METRIX GLUCOSE METER) Misc 1 Units by Misc.(Non-Drug; Combo Route) route once daily. Please check blood sugar while fasting (fisrt thing in the morning) 1 each 0    ergocalciferol (ERGOCALCIFEROL) 50,000 unit Cap Take 50,000 Units by mouth once a week.  3    lancets 33 gauge Misc 1 lancet by Misc.(Non-Drug; Combo Route) route 4 (four) times daily. 100 each 2    metFORMIN (GLUCOPHAGE) 1000 MG tablet Take 1 tablet (1,000 mg total) by mouth 2 (two) times daily with meals. 180 tablet 3       Objective:  /78 (BP Location: Left arm, Patient Position: Lying)   Pulse 99   Temp 97.9 °F (36.6 °C) (Axillary)   Resp 18   Ht 5' 4" (1.626 m)   Wt 104.3 kg (229 lb 15 oz)   SpO2 95%   Breastfeeding? No   BMI 39.47 kg/m²   General: wd, wn, nad  HE: ncat, perrl, eomi  ENT: op pink and moist without lesions or exudates  CV: +s1s2, no mrg, rrr  Resp: ctapb, no wrr  GI: bs active, abd soft, nt, nd  Skin: no lesions, no rash  Neuro: cn 2-12 in tact, no focal deficits, no asterixis  Psych: regular rate speech, normal affect    Labs:  Recent Results (from the past 2688 hour(s))   APTT    Collection Time: 11/27/19 10:34 AM   Result Value Ref Range    aPTT 27.5 23.6 - 33.3 sec   Amylase    Collection Time: 11/27/19 10:34 AM   Result Value Ref Range    Amylase 30 20 - 110 U/L   CBC auto differential    Collection Time: 11/27/19 10:34 AM   Result Value Ref Range    WBC 12.48 3.90 - 12.70 K/uL    RBC 5.76 (H) 4.00 - 5.40 M/uL    Hemoglobin 16.9 (H) 12.0 - 16.0 g/dL    Hematocrit 47.9 37.0 - 48.5 %    Mean Corpuscular Volume 83 82 - 98 fL    Mean Corpuscular Hemoglobin 29.3 27.0 - 31.0 pg    Mean Corpuscular Hemoglobin Conc 35.3 32.0 - 36.0 g/dL    RDW 12.1 11.5 - 14.5 %    Platelets 274 150 - 350 K/uL    MPV 10.8 9.2 - 12.9 fL    Immature Granulocytes 0.3 0.0 - 0.5 %    Gran # (ANC) 10.9 (H) 1.8 - 7.7 " K/uL    Immature Grans (Abs) 0.04 0.00 - 0.04 K/uL    Lymph # 1.1 1.0 - 4.8 K/uL    Mono # 0.4 0.3 - 1.0 K/uL    Eos # 0.0 0.0 - 0.5 K/uL    Baso # 0.03 0.00 - 0.20 K/uL    nRBC 0 0 /100 WBC    Gran% 87.1 (H) 38.0 - 73.0 %    Lymph% 9.1 (L) 18.0 - 48.0 %    Mono% 3.1 (L) 4.0 - 15.0 %    Eosinophil% 0.2 0.0 - 8.0 %    Basophil% 0.2 0.0 - 1.9 %    Differential Method Automated    Comprehensive metabolic panel    Collection Time: 11/27/19 10:34 AM   Result Value Ref Range    Sodium 132 (L) 136 - 145 mmol/L    Potassium 3.5 3.5 - 5.1 mmol/L    Chloride 97 95 - 110 mmol/L    CO2 23 23 - 29 mmol/L    Glucose 324 (H) 70 - 110 mg/dL    BUN, Bld 11 8 - 23 mg/dL    Creatinine 0.5 0.5 - 1.4 mg/dL    Calcium 9.1 8.7 - 10.5 mg/dL    Total Protein 8.2 6.0 - 8.4 g/dL    Albumin 4.6 3.5 - 5.2 g/dL    Total Bilirubin 1.4 (H) 0.1 - 1.0 mg/dL    Alkaline Phosphatase 92 55 - 135 U/L    AST 29 10 - 40 U/L    ALT 30 10 - 44 U/L    Anion Gap 12 8 - 16 mmol/L    eGFR if African American >60.0 >60 mL/min/1.73 m^2    eGFR if non African American >60.0 >60 mL/min/1.73 m^2   Lipase    Collection Time: 11/27/19 10:34 AM   Result Value Ref Range    Lipase 29 4 - 60 U/L   Magnesium    Collection Time: 11/27/19 10:34 AM   Result Value Ref Range    Magnesium 1.7 1.6 - 2.6 mg/dL   Protime-INR    Collection Time: 11/27/19 10:34 AM   Result Value Ref Range    PT 12.7 10.6 - 14.8 sec    INR 1.0    Troponin I    Collection Time: 11/27/19 10:34 AM   Result Value Ref Range    Troponin I <0.030 0.020 - 0.040 ng/mL   CK-MB    Collection Time: 11/27/19 10:34 AM   Result Value Ref Range    CPK MB 2.5 0.1 - 6.5 ng/mL   CK    Collection Time: 11/27/19 10:34 AM   Result Value Ref Range    CPK 76 20 - 180 U/L   Type & Screen    Collection Time: 11/27/19 10:34 AM   Result Value Ref Range    Group & Rh O POS     Indirect Rupesh NEG    Occult blood x 1, stool    Collection Time: 11/27/19 10:38 AM   Result Value Ref Range    Occult Blood Positive (A) Negative    Urinalysis, Reflex to Urine Culture Urine, Clean Catch    Collection Time: 11/27/19 11:01 AM   Result Value Ref Range    Specimen UA Urine, Clean Catch     Color, UA Yellow Yellow, Straw, Kayla    Appearance, UA Clear Clear    pH, UA 7.0 5.0 - 8.0    Specific Gravity, UA >1.030 (A) 1.005 - 1.030    Protein, UA 1+ (A) Negative    Glucose, UA 4+ (A) Negative    Ketones, UA 2+ (A) Negative    Bilirubin (UA) Negative Negative    Occult Blood UA Trace (A) Negative    Nitrite, UA Negative Negative    Urobilinogen, UA Negative Negative EU/dL    Leukocytes, UA Negative Negative   Urinalysis Microscopic    Collection Time: 11/27/19 11:01 AM   Result Value Ref Range    RBC, UA 2 0 - 4 /hpf    WBC, UA 10 (H) 0 - 5 /hpf    Bacteria Rare None-Occ /hpf    Yeast, UA None None    Squam Epithel, UA 2 /hpf    Hyaline Casts, UA 4 (A) 0-1/lpf /lpf    Microscopic Comment SEE COMMENT    Hemoglobin A1c if not done in past 3 months    Collection Time: 11/28/19  5:23 AM   Result Value Ref Range    Hemoglobin A1C 11.2 (H) 4.5 - 6.2 %    Estimated Avg Glucose 275 (H) 68 - 131 mg/dL   Hemoglobin A1c    Collection Time: 11/28/19  5:23 AM   Result Value Ref Range    Hemoglobin A1C 11.2 (H) 4.5 - 6.2 %    Estimated Avg Glucose 275 (H) 68 - 131 mg/dL   Comprehensive metabolic panel    Collection Time: 11/28/19  5:23 AM   Result Value Ref Range    Sodium 137 136 - 145 mmol/L    Potassium 3.4 (L) 3.5 - 5.1 mmol/L    Chloride 103 95 - 110 mmol/L    CO2 24 23 - 29 mmol/L    Glucose 240 (H) 70 - 110 mg/dL    BUN, Bld 19 8 - 23 mg/dL    Creatinine 0.6 0.5 - 1.4 mg/dL    Calcium 9.0 8.7 - 10.5 mg/dL    Total Protein 7.3 6.0 - 8.4 g/dL    Albumin 4.0 3.5 - 5.2 g/dL    Total Bilirubin 0.9 0.1 - 1.0 mg/dL    Alkaline Phosphatase 72 55 - 135 U/L    AST 21 10 - 40 U/L    ALT 33 10 - 44 U/L    Anion Gap 10 8 - 16 mmol/L    eGFR if African American >60.0 >60 mL/min/1.73 m^2    eGFR if non African American >60.0 >60 mL/min/1.73 m^2   Troponin I    Collection  "Time: 11/28/19  5:23 AM   Result Value Ref Range    Troponin I <0.030 0.020 - 0.040 ng/mL   Troponin I    Collection Time: 11/28/19  9:49 AM   Result Value Ref Range    Troponin I <0.030 0.020 - 0.040 ng/mL   POCT glucose    Collection Time: 11/28/19 11:38 AM   Result Value Ref Range    POC Glucose 218 (H) 70 - 110       Diagnostic Studies:  CT abd personally reviewed revealing thickening of the transverse and proximal left colon.         "There is a short segment of bowel wall thickening at the transverse colon/splenic flexure with mild pericolonic fat stranding.  The appendix is not identified.  The small bowel and stomach are unremarkable."    Assessment:  64-year-old female with clinical viral gastroenteritis resulting in likely ischemic colitis of the transverse and splenic  Flexure.    Plan:  Continue IV hydration, trial of clear  Liquid diet    Two tap water enemas in the morning and flex-sig to follow  "

## 2019-11-28 NOTE — PLAN OF CARE
Problem: Infection  Goal: Infection Symptom Resolution  11/27/2019 1931 by Clarice Rod LPN  Outcome: Ongoing, Progressing  11/27/2019 1428 by Clarice Rod LPN  Outcome: Ongoing, Progressing     Problem: Adult Inpatient Plan of Care  Goal: Plan of Care Review  11/27/2019 1931 by Clarice Rod LPN  Outcome: Ongoing, Progressing  11/27/2019 1428 by Clarice Rod LPN  Outcome: Ongoing, Progressing  Goal: Patient-Specific Goal (Individualization)  11/27/2019 1931 by Clarice Rod LPN  Outcome: Ongoing, Progressing  11/27/2019 1428 by Clarice Rod LPN  Outcome: Ongoing, Progressing  Goal: Absence of Hospital-Acquired Illness or Injury  11/27/2019 1931 by Clarice Rod LPN  Outcome: Ongoing, Progressing  11/27/2019 1428 by Clarice Rod LPN  Outcome: Ongoing, Progressing  Goal: Optimal Comfort and Wellbeing  11/27/2019 1931 by Clarice Rod LPN  Outcome: Ongoing, Progressing  11/27/2019 1428 by Clarice Rod LPN  Outcome: Ongoing, Progressing  Goal: Readiness for Transition of Care  11/27/2019 1931 by Clarice Rod LPN  Outcome: Ongoing, Progressing  11/27/2019 1428 by Clarice Rod LPN  Outcome: Ongoing, Progressing  Goal: Rounds/Family Conference  11/27/2019 1931 by Clarice Rod LPN  Outcome: Ongoing, Progressing  11/27/2019 1428 by Clarice Rod LPN  Outcome: Ongoing, Progressing     Problem: Diabetes Comorbidity  Goal: Blood Glucose Level Within Desired Range  11/27/2019 1931 by Clarice Rod LPN  Outcome: Ongoing, Progressing  11/27/2019 1428 by Clarice Rod LPN  Outcome: Ongoing, Progressing     Problem: Fall Injury Risk  Goal: Absence of Fall and Fall-Related Injury  Outcome: Ongoing, Progressing

## 2019-11-28 NOTE — ASSESSMENT & PLAN NOTE
Clinical history and imaging likely consistent with ischemic colitis  GI following; appreciate input  Continue IV fluids  Empiric antibiotics in case of infectious etiology; will deescalate accordingly  Added on ESR and CRP  Plan for flexible sigmoidoscopy tomorrow  Clear liquid diet

## 2019-11-28 NOTE — ASSESSMENT & PLAN NOTE
Poorly-controlled  HbA1c is 11.2%  Patient admits to noncompliance with insulin therapy  Low-dose insulin sliding scale as patient is currently on liquid diet for possible procedure tomorrow  Will need basal bolus regimen moving head  Accu-Cheks as per protocol

## 2019-11-28 NOTE — PROGRESS NOTES
[facility]  Progress Note    Patient Name: Yoshi Lennon  MRN: [id number]  Patient Class: IP- Inpatient   Admission Date: 11/27/2019  Length of Stay: 1 days  Attending Physician: Artemio Dietrich MD  Primary Care Provider: Primary Doctor No        Subjective:     Principal Problem:Colitis, regional, with rectal bleeding        HPI:  64 year old female presented to ED complaining of 12-18 hours of central to LLQ abdominal pain constant 7-8/10 sharp stabbing. She started to have bright red blood per rectum this AM. She has had bright red blood pr in ED. No N/V. No CP/SOB. Labs personally reviewed H/H currently is 16/47. She has DM2 with glucose of 324. EKG personally reviewed = sinus tach without ischemic changes. Patient personally discussed with ED physician will admit, NPO, GI consult, insulin sliding scale    Overview/Hospital Course:  No notes on file    Interval History:  Seen and examined bedside after being admitted for abdominal pain with bloody diarrhea.  Patient endorses acute intermittent sharp left lower quadrant and suprapubic abdominal pain which is better with pain medications.  No exacerbating factors.  It is associated with bloody diarrhea.  Patient with no bowel movements since admission however she reports clots of fresh blood earlier this morning.  Denies lightheadedness/dizziness, nausea/vomiting, chest pain or shortness of breath.      Objective:     Vital Signs (Most Recent):  Temp: 97.9 °F (36.6 °C) (11/28/19 1154)  Pulse: 99 (11/28/19 1154)  Resp: 18 (11/28/19 1154)  BP: 125/78 (11/28/19 1154)  SpO2: 95 % (11/28/19 1154) Vital Signs (24h Range):  Temp:  [97.8 °F (36.6 °C)-98.3 °F (36.8 °C)] 97.9 °F (36.6 °C)  Pulse:  [81-99] 99  Resp:  [18] 18  SpO2:  [94 %-97 %] 95 %  BP: (125-166)/(70-88) 125/78     Weight: 104.3 kg (229 lb 15 oz)  Body mass index is 39.47 kg/m².    Intake/Output Summary (Last 24 hours) at 11/28/2019 1359  Last data filed at 11/28/2019  0600  Gross per 24 hour   Intake 1392.5 ml   Output --   Net 1392.5 ml      Physical Exam   Constitutional: She is oriented to person, place, and time. She appears well-developed and well-nourished. No distress.   HENT:   Head: Normocephalic and atraumatic.   Eyes: Conjunctivae are normal. No scleral icterus.   Neck: Neck supple. No thyromegaly present.   Cardiovascular: Normal rate, regular rhythm, normal heart sounds and intact distal pulses.   No murmur heard.  Pulmonary/Chest: Effort normal and breath sounds normal. No respiratory distress.   Abdominal: Soft. Bowel sounds are normal. There is tenderness.   Musculoskeletal: She exhibits no edema or deformity.   Neurological: She is alert and oriented to person, place, and time. She has normal strength. No sensory deficit.   Skin: Skin is warm and dry. Capillary refill takes less than 2 seconds. No erythema.   Psychiatric: She has a normal mood and affect. Her behavior is normal. Judgment and thought content normal.   Nursing note and vitals reviewed.      Significant Labs:   CBC:   Recent Labs   Lab 11/27/19  1034   WBC 12.48   HGB 16.9*   HCT 47.9        CMP:   Recent Labs   Lab 11/27/19  1034 11/28/19  0523   * 137   K 3.5 3.4*   CL 97 103   CO2 23 24   * 240*   BUN 11 19   CREATININE 0.5 0.6   CALCIUM 9.1 9.0   PROT 8.2 7.3   ALBUMIN 4.6 4.0   BILITOT 1.4* 0.9   ALKPHOS 92 72   AST 29 21   ALT 30 33   ANIONGAP 12 10   EGFRNONAA >60.0 >60.0     Lipase:   Recent Labs   Lab 11/27/19  1034   LIPASE 29       Significant Imaging: I have reviewed all pertinent imaging results/findings within the past 24 hours.     Imaging Results          CT Abdomen Pelvis With Contrast (Final result)  Result time 11/27/19 12:33:22    Final result by Barrett Christopher MD (11/27/19 12:33:22)                 Impression:      1. Short segment of bowel wall thickening at the transverse colon/splenic flexure with mild pericolonic fat stranding, consistent with colitis,  infectious versus inflammatory.  2. Mild decrease in size of right ovarian cyst.      Electronically signed by: Barrett Christopher MD  Date:    11/27/2019  Time:    12:33             Narrative:      CMS MANDATED QUALITY DATA - CT RADIATION - 436    All CT scans at this facility utilize dose modulation, iterative reconstruction, and/or weight based dosing when appropriate to reduce radiation dose to as low as reasonably achievable.    EXAMINATION:  CT ABDOMEN PELVIS WITH CONTRAST    CLINICAL HISTORY:  Abd pain, fever, abscess suspected;Abdominal pain;    TECHNIQUE:  CT abdomen and pelvis with 100 mL Omnipaque 350.    COMPARISON:  CT abdomen and pelvis 11/29/2018.    FINDINGS:  There is bibasilar subsegmental atelectasis.  The heart is normal size.    The liver is normal size.  No enhancing hepatic lesions identified.  The gallbladder, common bile duct, pancreas, and spleen are unremarkable with no focal abnormalities identified.  Bilateral adrenal adenomas are unchanged.    Kidneys are normal size.  No hydronephrosis or nephrolithiasis.  No cyst or mass.  The ureters are normal caliber.  The uterus is anteverted.  A right ovarian cyst is again demonstrated, mildly decreased in size from previous exam and measures 2.6 x 2.6 cm in diameter.    There is a short segment of bowel wall thickening at the transverse colon/splenic flexure with mild pericolonic fat stranding.  The appendix is not identified.  The small bowel and stomach are unremarkable.    The aorta is normal caliber.  No periaortic or mesenteric lymphadenopathy.  No intra-abdominal free fluid.  Abdominal wall is unremarkable.  The osseous structures are unremarkable.                                    Assessment/Plan:      * Colitis, regional, with rectal bleeding  Clinical history and imaging likely consistent with ischemic colitis  GI following; appreciate input  Continue IV fluids  Empiric antibiotics in case of infectious etiology; will deescalate  accordingly  Added on ESR and CRP  Plan for flexible sigmoidoscopy tomorrow  Clear liquid diet      Rectal bleed  Plan as above for colitis with rectal bleeding      Benign essential hypertension  Hypertensive emergency on arrival which has since resolved  Continue home olmesartan   Hold hydrochlorothiazide in the setting of rectal bleeding and concern for dehydration/hypotension  IV hydralazine p.r.n. with parameters      Type 2 diabetes mellitus with hyperglycemia, with long-term current use of insulin  Poorly-controlled  HbA1c is 11.2%  Patient admits to noncompliance with insulin therapy  Low-dose insulin sliding scale as patient is currently on liquid diet for possible procedure tomorrow  Will need basal bolus regimen moving head  Accu-Cheks as per protocol        VTE Risk Mitigation (From admission, onward)         Ordered     IP VTE HIGH RISK PATIENT  Once      11/28/19 0359     Place ABRAM hose  Until discontinued      11/28/19 0359     Reason for No Pharmacological VTE Prophylaxis  Once     Question:  Reasons:  Answer:  Active Bleeding    11/28/19 0359                      Artemio Dietrich MD  Department of Hospital Medicine   [facility]

## 2019-11-28 NOTE — NURSING
Patient states she went to the bathroom to urinate and did not try to have a BM but when she let out air, blood came out and saw blood on paper after wiping.

## 2019-11-28 NOTE — ASSESSMENT & PLAN NOTE
Hypertensive emergency on arrival which has since resolved  Continue home olmesartan   Hold hydrochlorothiazide in the setting of rectal bleeding and concern for dehydration/hypotension  IV hydralazine p.r.n. with parameters

## 2019-11-28 NOTE — PLAN OF CARE
Problem: Infection  Goal: Infection Symptom Resolution  Outcome: Ongoing, Progressing     Problem: Adult Inpatient Plan of Care  Goal: Plan of Care Review  Outcome: Ongoing, Progressing     Problem: Adult Inpatient Plan of Care  Goal: Absence of Hospital-Acquired Illness or Injury  Outcome: Ongoing, Progressing     Problem: Adult Inpatient Plan of Care  Goal: Optimal Comfort and Wellbeing  Outcome: Ongoing, Progressing     Problem: Adult Inpatient Plan of Care  Goal: Readiness for Transition of Care  Outcome: Ongoing, Progressing     Problem: Adult Inpatient Plan of Care  Goal: Rounds/Family Conference  Outcome: Ongoing, Progressing     Problem: Diabetes Comorbidity  Goal: Blood Glucose Level Within Desired Range  Outcome: Ongoing, Progressing     Problem: Fall Injury Risk  Goal: Absence of Fall and Fall-Related Injury  Outcome: Ongoing, Progressing

## 2019-11-28 NOTE — SUBJECTIVE & OBJECTIVE
Interval History:  Seen and examined bedside after being admitted for abdominal pain with bloody diarrhea.  Patient endorses acute intermittent sharp left lower quadrant and suprapubic abdominal pain which is better with pain medications.  No exacerbating factors.  It is associated with bloody diarrhea.  Patient with no bowel movements since admission however she reports clots of fresh blood earlier this morning.  Denies lightheadedness/dizziness, nausea/vomiting, chest pain or shortness of breath.      Objective:     Vital Signs (Most Recent):  Temp: 97.9 °F (36.6 °C) (11/28/19 1154)  Pulse: 99 (11/28/19 1154)  Resp: 18 (11/28/19 1154)  BP: 125/78 (11/28/19 1154)  SpO2: 95 % (11/28/19 1154) Vital Signs (24h Range):  Temp:  [97.8 °F (36.6 °C)-98.3 °F (36.8 °C)] 97.9 °F (36.6 °C)  Pulse:  [81-99] 99  Resp:  [18] 18  SpO2:  [94 %-97 %] 95 %  BP: (125-166)/(70-88) 125/78     Weight: 104.3 kg (229 lb 15 oz)  Body mass index is 39.47 kg/m².    Intake/Output Summary (Last 24 hours) at 11/28/2019 1359  Last data filed at 11/28/2019 0600  Gross per 24 hour   Intake 1392.5 ml   Output --   Net 1392.5 ml      Physical Exam   Constitutional: She is oriented to person, place, and time. She appears well-developed and well-nourished. No distress.   HENT:   Head: Normocephalic and atraumatic.   Eyes: Conjunctivae are normal. No scleral icterus.   Neck: Neck supple. No thyromegaly present.   Cardiovascular: Normal rate, regular rhythm, normal heart sounds and intact distal pulses.   No murmur heard.  Pulmonary/Chest: Effort normal and breath sounds normal. No respiratory distress.   Abdominal: Soft. Bowel sounds are normal. There is tenderness.   Musculoskeletal: She exhibits no edema or deformity.   Neurological: She is alert and oriented to person, place, and time. She has normal strength. No sensory deficit.   Skin: Skin is warm and dry. Capillary refill takes less than 2 seconds. No erythema.   Psychiatric: She has a normal  mood and affect. Her behavior is normal. Judgment and thought content normal.   Nursing note and vitals reviewed.      Significant Labs:   CBC:   Recent Labs   Lab 11/27/19  1034   WBC 12.48   HGB 16.9*   HCT 47.9        CMP:   Recent Labs   Lab 11/27/19  1034 11/28/19  0523   * 137   K 3.5 3.4*   CL 97 103   CO2 23 24   * 240*   BUN 11 19   CREATININE 0.5 0.6   CALCIUM 9.1 9.0   PROT 8.2 7.3   ALBUMIN 4.6 4.0   BILITOT 1.4* 0.9   ALKPHOS 92 72   AST 29 21   ALT 30 33   ANIONGAP 12 10   EGFRNONAA >60.0 >60.0     Lipase:   Recent Labs   Lab 11/27/19  1034   LIPASE 29       Significant Imaging: I have reviewed all pertinent imaging results/findings within the past 24 hours.     Imaging Results          CT Abdomen Pelvis With Contrast (Final result)  Result time 11/27/19 12:33:22    Final result by Barrett Christopher MD (11/27/19 12:33:22)                 Impression:      1. Short segment of bowel wall thickening at the transverse colon/splenic flexure with mild pericolonic fat stranding, consistent with colitis, infectious versus inflammatory.  2. Mild decrease in size of right ovarian cyst.      Electronically signed by: Barrett Christopher MD  Date:    11/27/2019  Time:    12:33             Narrative:      CMS MANDATED QUALITY DATA - CT RADIATION - 436    All CT scans at this facility utilize dose modulation, iterative reconstruction, and/or weight based dosing when appropriate to reduce radiation dose to as low as reasonably achievable.    EXAMINATION:  CT ABDOMEN PELVIS WITH CONTRAST    CLINICAL HISTORY:  Abd pain, fever, abscess suspected;Abdominal pain;    TECHNIQUE:  CT abdomen and pelvis with 100 mL Omnipaque 350.    COMPARISON:  CT abdomen and pelvis 11/29/2018.    FINDINGS:  There is bibasilar subsegmental atelectasis.  The heart is normal size.    The liver is normal size.  No enhancing hepatic lesions identified.  The gallbladder, common bile duct, pancreas, and spleen are unremarkable with no  focal abnormalities identified.  Bilateral adrenal adenomas are unchanged.    Kidneys are normal size.  No hydronephrosis or nephrolithiasis.  No cyst or mass.  The ureters are normal caliber.  The uterus is anteverted.  A right ovarian cyst is again demonstrated, mildly decreased in size from previous exam and measures 2.6 x 2.6 cm in diameter.    There is a short segment of bowel wall thickening at the transverse colon/splenic flexure with mild pericolonic fat stranding.  The appendix is not identified.  The small bowel and stomach are unremarkable.    The aorta is normal caliber.  No periaortic or mesenteric lymphadenopathy.  No intra-abdominal free fluid.  Abdominal wall is unremarkable.  The osseous structures are unremarkable.

## 2019-11-29 VITALS
HEART RATE: 84 BPM | DIASTOLIC BLOOD PRESSURE: 76 MMHG | WEIGHT: 229.94 LBS | BODY MASS INDEX: 39.26 KG/M2 | OXYGEN SATURATION: 94 % | SYSTOLIC BLOOD PRESSURE: 131 MMHG | RESPIRATION RATE: 16 BRPM | HEIGHT: 64 IN | TEMPERATURE: 98 F

## 2019-11-29 PROBLEM — K62.5 RECTAL BLEED: Status: RESOLVED | Noted: 2019-11-27 | Resolved: 2019-11-29

## 2019-11-29 PROBLEM — K62.5 RECTAL BLEEDING: Status: RESOLVED | Noted: 2019-11-27 | Resolved: 2019-11-29

## 2019-11-29 PROBLEM — K92.2 LOWER GI BLEED: Status: RESOLVED | Noted: 2019-11-27 | Resolved: 2019-11-29

## 2019-11-29 LAB
ALBUMIN SERPL BCP-MCNC: 3.8 G/DL (ref 3.5–5.2)
ALP SERPL-CCNC: 68 U/L (ref 55–135)
ALT SERPL W/O P-5'-P-CCNC: 28 U/L (ref 10–44)
ANION GAP SERPL CALC-SCNC: 11 MMOL/L (ref 8–16)
AST SERPL-CCNC: 15 U/L (ref 10–40)
BILIRUB SERPL-MCNC: 1.1 MG/DL (ref 0.1–1)
BUN SERPL-MCNC: 20 MG/DL (ref 8–23)
CALCIUM SERPL-MCNC: 9.1 MG/DL (ref 8.7–10.5)
CHLORIDE SERPL-SCNC: 101 MMOL/L (ref 95–110)
CO2 SERPL-SCNC: 24 MMOL/L (ref 23–29)
CREAT SERPL-MCNC: 0.5 MG/DL (ref 0.5–1.4)
ERYTHROCYTE [DISTWIDTH] IN BLOOD BY AUTOMATED COUNT: 12.4 % (ref 11.5–14.5)
EST. GFR  (AFRICAN AMERICAN): >60 ML/MIN/1.73 M^2
EST. GFR  (NON AFRICAN AMERICAN): >60 ML/MIN/1.73 M^2
GLUCOSE SERPL-MCNC: 201 MG/DL (ref 70–110)
GLUCOSE SERPL-MCNC: 233 MG/DL (ref 70–110)
GLUCOSE SERPL-MCNC: 234 MG/DL (ref 70–110)
HCT VFR BLD AUTO: 46 % (ref 37–48.5)
HGB BLD-MCNC: 15.9 G/DL (ref 12–16)
MAGNESIUM SERPL-MCNC: 1.9 MG/DL (ref 1.6–2.6)
MCH RBC QN AUTO: 29.3 PG (ref 27–31)
MCHC RBC AUTO-ENTMCNC: 34.6 G/DL (ref 32–36)
MCV RBC AUTO: 85 FL (ref 82–98)
PLATELET # BLD AUTO: 281 K/UL (ref 150–350)
PMV BLD AUTO: 10.9 FL (ref 9.2–12.9)
POTASSIUM SERPL-SCNC: 3 MMOL/L (ref 3.5–5.1)
PROT SERPL-MCNC: 7.2 G/DL (ref 6–8.4)
RBC # BLD AUTO: 5.42 M/UL (ref 4–5.4)
SODIUM SERPL-SCNC: 136 MMOL/L (ref 136–145)
WBC # BLD AUTO: 12.84 K/UL (ref 3.9–12.7)

## 2019-11-29 PROCEDURE — 63600175 PHARM REV CODE 636 W HCPCS: Performed by: INTERNAL MEDICINE

## 2019-11-29 PROCEDURE — 27200043 HC FORCEPS, BIOPSY: Performed by: INTERNAL MEDICINE

## 2019-11-29 PROCEDURE — 94761 N-INVAS EAR/PLS OXIMETRY MLT: CPT

## 2019-11-29 PROCEDURE — 45380 COLONOSCOPY AND BIOPSY: CPT | Performed by: INTERNAL MEDICINE

## 2019-11-29 PROCEDURE — 80053 COMPREHEN METABOLIC PANEL: CPT

## 2019-11-29 PROCEDURE — 99153 MOD SED SAME PHYS/QHP EA: CPT | Performed by: INTERNAL MEDICINE

## 2019-11-29 PROCEDURE — 99152 MOD SED SAME PHYS/QHP 5/>YRS: CPT | Performed by: INTERNAL MEDICINE

## 2019-11-29 PROCEDURE — 88305 TISSUE EXAM BY PATHOLOGIST: CPT | Mod: TC

## 2019-11-29 PROCEDURE — 36415 COLL VENOUS BLD VENIPUNCTURE: CPT

## 2019-11-29 PROCEDURE — 85027 COMPLETE CBC AUTOMATED: CPT

## 2019-11-29 PROCEDURE — 82962 GLUCOSE BLOOD TEST: CPT

## 2019-11-29 PROCEDURE — 83735 ASSAY OF MAGNESIUM: CPT

## 2019-11-29 RX ORDER — FENTANYL CITRATE 50 UG/ML
INJECTION, SOLUTION INTRAMUSCULAR; INTRAVENOUS
Status: DISCONTINUED | OUTPATIENT
Start: 2019-11-29 | End: 2019-11-29 | Stop reason: HOSPADM

## 2019-11-29 RX ORDER — MIDAZOLAM HYDROCHLORIDE 1 MG/ML
INJECTION INTRAMUSCULAR; INTRAVENOUS
Status: DISCONTINUED | OUTPATIENT
Start: 2019-11-29 | End: 2019-11-29 | Stop reason: HOSPADM

## 2019-11-29 RX ORDER — INSULIN GLARGINE 100 [IU]/ML
10 INJECTION, SOLUTION SUBCUTANEOUS NIGHTLY
Qty: 9 ML | Refills: 3 | Status: SHIPPED | OUTPATIENT
Start: 2019-11-29 | End: 2020-11-28

## 2019-11-29 RX ADMIN — MIDAZOLAM HYDROCHLORIDE 1 MG: 1 INJECTION, SOLUTION INTRAMUSCULAR; INTRAVENOUS at 09:11

## 2019-11-29 RX ADMIN — FENTANYL CITRATE 25 MCG: 50 INJECTION, SOLUTION INTRAMUSCULAR; INTRAVENOUS at 09:11

## 2019-11-29 RX ADMIN — HYDROMORPHONE HYDROCHLORIDE 0.5 MG: 1 INJECTION, SOLUTION INTRAMUSCULAR; INTRAVENOUS; SUBCUTANEOUS at 02:11

## 2019-11-29 RX ADMIN — MIDAZOLAM HYDROCHLORIDE 2 MG: 1 INJECTION, SOLUTION INTRAMUSCULAR; INTRAVENOUS at 09:11

## 2019-11-29 RX ADMIN — PIPERACILLIN AND TAZOBACTAM 3.38 G: 3; .375 INJECTION, POWDER, LYOPHILIZED, FOR SOLUTION INTRAVENOUS; PARENTERAL at 04:11

## 2019-11-29 RX ADMIN — FENTANYL CITRATE 50 MCG: 50 INJECTION, SOLUTION INTRAMUSCULAR; INTRAVENOUS at 09:11

## 2019-11-29 RX ADMIN — INSULIN ASPART 1 UNITS: 100 INJECTION, SOLUTION INTRAVENOUS; SUBCUTANEOUS at 01:11

## 2019-11-29 NOTE — HOSPITAL COURSE
Admitted with left lower quadrant and supra pubic abdominal pain in the setting of bright red blood per rectum.  Seen by GI and underwent flexible sigmoidoscopy.  Findings consistent with ischemic colitis.  Spontaneous resolution of symptoms with conservative management.  Deemed stable for discharge home.  Advised to follow up with GI in 2 weeks about biopsies obtained during flexible sigmoidoscopy done today.  Advised to hold off on aspirin for 3 days.  HbA1c on this admission is noted to be greater than 11%.  Patient admits noncompliance with home insulin regimen.  She has been prescribed basal insulin. Patient moved to [place] in February 2019 and has no primary care physician in the area.  She has been provided with info about the same.  Voiced understanding about adherence to prescribed medications.    Instructions provided to follow up with primary care physician as outpatient. Patient verbalized understanding and is aware to contact primary care physician or return to ED if new or worsening symptoms.    Physical exam on the day of discharge:  General: Patient resting comfortably in no acute distress.  Lungs: CTA. Good air entry.  Cor: Regular rate and rhythm. No murmurs. No pedal edema.  Abd: Soft. Nontender. Non-distended.  Neuro: A&O x3. Moving all 4 extremities equally  Ext: No clubbing. No cyanosis.

## 2019-11-29 NOTE — [employer] PATIENT INSTRUCTIONS
Discharge Summary/Instructions after an Endoscopic Procedure  Patient Name: Yoshi Lennon  Patient MRN: [id number]  Patient YOB: 1955 Friday, November 29, 2019  Alfredo Cano III, MD  RESTRICTIONS:  During your procedure today, you received medications for sedation.  These   medications may affect your judgment, balance and coordination.  Therefore,   for 24 hours, you have the following restrictions:   - DO NOT drive a car, operate machinery, make legal/financial decisions,   sign important papers or drink alcohol.    ACTIVITY:  Today: no heavy lifting, straining or running due to procedural   sedation/anesthesia.  The following day: return to full activity including work.  DIET:  Eat and drink normally unless instructed otherwise.     TREATMENT FOR COMMON SIDE EFFECTS:  - Mild abdominal pain, nausea, belching, bloating or excessive gas:  rest,   eat lightly and use a heating pad.  - Sore Throat: treat with throat lozenges and/or gargle with warm salt   water.  - Because air was used during the procedure, expelling large amounts of air   from your rectum or belching is normal.  - If a bowel prep was taken, you may not have a bowel movement for 1-3 days.    This is normal.  SYMPTOMS TO WATCH FOR AND REPORT TO YOUR PHYSICIAN:  1. Abdominal pain or bloating, other than gas cramps.  2. Chest pain.  3. Back pain.  4. Signs of infection such as: chills or fever occurring within 24 hours   after the procedure.  5. Rectal bleeding, which would show as bright red, maroon, or black stools.   (A tablespoon of blood from the rectum is not serious, especially if   hemorrhoids are present.)  6. Vomiting.  7. Weakness or dizziness.  GO DIRECTLY TO THE NEAREST EMERGENCY ROOM IF YOU HAVE ANY OF THE FOLLOWING:      Difficulty breathing              Chills and/or fever over 101 F   Persistent vomiting and/or vomiting blood   Severe abdominal pain   Severe chest pain   Black, tarry stools   Bleeding- more than one  tablespoon   Any other symptom or condition that you feel may need urgent attention  Your doctor recommends these additional instructions:  If any biopsies were taken, your doctors clinic will contact you in 1 to 2   weeks with any results.  - Patient has a contact number available for emergencies.  The signs and   symptoms of potential delayed complications were discussed with the   patient.  Return to normal activities tomorrow.  Written discharge   instructions were provided to the patient.   - Resume previous diet.   - Discharge patient to home (ambulatory).   - Continue present medications.   - Return to GI clinic PRN.  For questions, problems or results please call your physician - Alfredo Cano III, MD at Work:  [phone].  [facility], EMERGENCY ROOM PHONE NUMBER: [phone]  IF A COMPLICATION OR EMERGENCY SITUATION ARISES AND YOU ARE UNABLE TO REACH   YOUR PHYSICIAN - GO DIRECTLY TO THE EMERGENCY ROOM.  Alfredo Cano III, MD  11/29/2019 10:18:47 AM  This report has been verified and signed electronically.  [employer]

## 2019-11-29 NOTE — DISCHARGE SUMMARY
[facility] Medicine  Discharge Summary      Patient Name: Yoshi Lennon  MRN: [id number]  Admission Date: 11/27/2019  Hospital Length of Stay: 2 days  Discharge Date and Time: 11/29/2019  4:32 PM  Attending Physician: No att. providers found   Discharging Provider: Artemio Dietrich MD  Primary Care Provider: Primary Doctor No      HPI:   64 year old female presented to ED complaining of 12-18 hours of central to LLQ abdominal pain constant 7-8/10 sharp stabbing. She started to have bright red blood per rectum this AM. She has had bright red blood pr in ED. No N/V. No CP/SOB. Labs personally reviewed H/H currently is 16/47. She has DM2 with glucose of 324. EKG personally reviewed = sinus tach without ischemic changes. Patient personally discussed with ED physician will admit, NPO, GI consult, insulin sliding scale    Procedure(s) (LRB):  SIGMOIDOSCOPY, FLEXIBLE (Left)      Hospital Course:   Admitted with left lower quadrant and supra pubic abdominal pain in the setting of bright red blood per rectum.  Seen by GI and underwent flexible sigmoidoscopy.  Findings consistent with ischemic colitis.  Spontaneous resolution of symptoms with conservative management.  Deemed stable for discharge home.  Advised to follow up with GI in 2 weeks about biopsies obtained during flexible sigmoidoscopy done today.  Advised to hold off on aspirin for 3 days.  HbA1c on this admission is noted to be greater than 11%.  Patient admits noncompliance with home insulin regimen.  She has been prescribed basal insulin. Patient moved to [place] in February 2019 and has no primary care physician in the area.  She has been provided with info about the same.  Voiced understanding about adherence to prescribed medications.    Instructions provided to follow up with primary care physician as outpatient. Patient verbalized understanding and is aware to contact primary care physician or return to ED if new or worsening  symptoms.    Physical exam on the day of discharge:  General: Patient resting comfortably in no acute distress.  Lungs: CTA. Good air entry.  Cor: Regular rate and rhythm. No murmurs. No pedal edema.  Abd: Soft. Nontender. Non-distended.  Neuro: A&O x3. Moving all 4 extremities equally  Ext: No clubbing. No cyanosis.            Consults:   Consults (From admission, onward)        Status Ordering Provider     Inpatient consult to Gastroenterology  Once     Provider:  Alfredo Cano III, MD    Completed CHERRY LIU     Inpatient consult to Hospitalist  Once     Provider:  Cherry Liu MD    Acknowledged CHERRY LIU          No new Assessment & Plan notes have been filed under this hospital service since the last note was generated.  Service: Hospital Medicine    Final Active Diagnoses:    Diagnosis Date Noted POA    PRINCIPAL PROBLEM:  Colitis, regional, with rectal bleeding [K50.111] 11/28/2019 Yes    Type 2 diabetes mellitus with hyperglycemia, with long-term current use of insulin [E11.65, Z79.4] 05/31/2014 Not Applicable    Benign essential hypertension [I10] 11/27/2012 Yes      Problems Resolved During this Admission:    Diagnosis Date Noted Date Resolved POA    Rectal bleed [K62.5] 11/27/2019 11/29/2019 Yes    Rectal bleeding [K62.5] 11/27/2019 11/29/2019 Yes    Lower GI bleed [K92.2] 11/27/2019 11/29/2019 Yes       Discharged Condition: fair    Disposition: Home or Self Care    Follow Up:  Follow-up Information     Alfredo Cano III, MD. Schedule an appointment as soon as possible for a visit in 2 weeks.    Specialty:  Gastroenterology  Why:  Rectal bleed follow-up. Discuss about biopsies   Contact information:  [address] [facility] [address] [phone]                 Patient Instructions:      Diet Cardiac     Notify your health care provider if you experience any of the following:  temperature >100.4     Notify your health care provider if you experience any of  the following:  persistent nausea and vomiting or diarrhea     Notify your health care provider if you experience any of the following:  persistent dizziness, light-headedness, or visual disturbances     Activity as tolerated       Significant Diagnostic Studies: Labs:   CMP   Recent Labs   Lab 11/28/19  0523 11/29/19  0616    136   K 3.4* 3.0*    101   CO2 24 24   * 234*   BUN 19 20   CREATININE 0.6 0.5   CALCIUM 9.0 9.1   PROT 7.3 7.2   ALBUMIN 4.0 3.8   BILITOT 0.9 1.1*   ALKPHOS 72 68   AST 21 15   ALT 33 28   ANIONGAP 10 11   ESTGFRAFRICA >60.0 >60.0   EGFRNONAA >60.0 >60.0    and CBC   Recent Labs   Lab 11/29/19  0617   WBC 12.84*   HGB 15.9   HCT 46.0          Pending Diagnostic Studies:     Procedure Component Value Units Date/Time    Specimen to Pathology - Surgery [[id number]] Collected:  11/29/19 1020    Order Status:  Sent Lab Status:  No result     Specimen:  Tissue          Medications:  Reconciled Home Medications:      Medication List      START taking these medications    insulin glargine 100 unit/mL injection  Commonly known as:  LANTUS  Inject 10 Units into the skin every evening.        CONTINUE taking these medications    aspirin 81 MG Chew  Take 1 tablet (81 mg total) by mouth once daily.     atorvastatin 40 MG tablet  Commonly known as:  LIPITOR  Take 1 tablet (40 mg total) by mouth once daily.     blood sugar diagnostic Strp  1 strip by Misc.(Non-Drug; Combo Route) route once daily.     blood-glucose meter Misc  Commonly known as:  True Metrix Glucose Meter  1 Units by Misc.(Non-Drug; Combo Route) route once daily. Please check blood sugar while fasting (fisrt thing in the morning)     ergocalciferol 50,000 unit Cap  Commonly known as:  ERGOCALCIFEROL  Take 50,000 Units by mouth once a week.     lancets 33 gauge Misc  1 lancet by Misc.(Non-Drug; Combo Route) route 4 (four) times daily.     metFORMIN 1000 MG tablet  Commonly known as:  GLUCOPHAGE  Take 1 tablet (1,000  mg total) by mouth 2 (two) times daily with meals.     olmesartan-hydrochlorothiazide 40-25 mg per tablet  Commonly known as:  BENICAR HCT  Take 1 tablet by mouth once daily.     omeprazole 20 MG capsule  Commonly known as:  PRILOSEC  Take 1 capsule (20 mg total) by mouth once daily.     predniSONE 2.5 MG tablet  Commonly known as:  DELTASONE  Take 2.5 mg by mouth once daily.        STOP taking these medications    doxycycline 100 MG tablet  Commonly known as:  VIBRA-TABS     naproxen 500 MG tablet  Commonly known as:  NAPROSYN            Indwelling Lines/Drains at time of discharge:   Lines/Drains/Airways     None                 Time spent on the discharge of patient: 35 minutes  Patient was seen and examined on the date of discharge and determined to be suitable for discharge.         Artemio Dietrich MD  Department of Hospital Medicine  [facility]

## 2019-11-29 NOTE — PLAN OF CARE
Problem: Infection  Goal: Infection Symptom Resolution  Outcome: Ongoing, Progressing     Problem: Adult Inpatient Plan of Care  Goal: Plan of Care Review  Outcome: Ongoing, Progressing

## 2019-11-29 NOTE — PLAN OF CARE
Problem: Infection  Goal: Infection Symptom Resolution  Outcome: Ongoing, Progressing     Problem: Diabetes Comorbidity  Goal: Blood Glucose Level Within Desired Range  Outcome: Ongoing, Progressing

## 2019-12-17 ENCOUNTER — TELEPHONE (OUTPATIENT)
Dept: FAMILY MEDICINE | Facility: CLINIC | Age: 64
End: 2019-12-17

## 2020-01-08 ENCOUNTER — TELEPHONE (OUTPATIENT)
Dept: FAMILY MEDICINE | Facility: CLINIC | Age: 65
End: 2020-01-08

## 2025-08-02 ENCOUNTER — TELEPHONE (OUTPATIENT)
Dept: PHARMACY | Facility: CLINIC | Age: 70
End: 2025-08-02
Payer: MEDICARE

## 2025-08-02 NOTE — TELEPHONE ENCOUNTER
[facility]/Population Health Chart Review & Patient Outreach Details For Medication Adherence Project    Reason for Outreach Encounter: 3rd Party payor non-compliance report ([employer], [employer], C, etc)  2.  Patient Outreach Method: Reviewed patient chart   3.   Medication in question:    Diabetes Medications              insulin glargine (LANTUS) 100 unit/mL injection Inject 10 Units into the skin every evening.    metFORMIN (GLUCOPHAGE) 1000 MG tablet Take 1 tablet (1,000 mg total) by mouth 2 (two) times daily with meals.                 jardiance  last filled  7/12 for 90 day supply      4.  Reviewed and or Updates Made To: Patient Chart  5. Outreach Outcomes and/or actions taken: Patient filled medication and is on track to be adherent  Additional Notes:

## (undated) DEVICE — WATER STERILE INJ 500ML BAG

## (undated) DEVICE — SHEET DRAPE MEDIUM

## (undated) DEVICE — APRON DISPOSP PLASTIC 24INX42

## (undated) DEVICE — CONTAINER SPECIMEN STRL 4OZ

## (undated) DEVICE — SEE MEDLINE ITEM 152651

## (undated) DEVICE — SOLN BETADINE SWAB AIDS

## (undated) DEVICE — JELLY LUBRICANT STERILE 4 OZ

## (undated) DEVICE — SET CYSTO IRRIGATION UNIV SPIK

## (undated) DEVICE — UNDERGLOVES BIOGEL PI SZ 6 LF